# Patient Record
Sex: MALE | Race: WHITE | NOT HISPANIC OR LATINO | URBAN - METROPOLITAN AREA
[De-identification: names, ages, dates, MRNs, and addresses within clinical notes are randomized per-mention and may not be internally consistent; named-entity substitution may affect disease eponyms.]

---

## 2018-02-18 ENCOUNTER — EMERGENCY (EMERGENCY)
Facility: HOSPITAL | Age: 63
LOS: 1 days | Discharge: HOME | End: 2018-02-18
Attending: EMERGENCY MEDICINE | Admitting: EMERGENCY MEDICINE
Payer: MEDICARE

## 2018-02-18 VITALS
SYSTOLIC BLOOD PRESSURE: 171 MMHG | TEMPERATURE: 100 F | RESPIRATION RATE: 20 BRPM | HEART RATE: 106 BPM | DIASTOLIC BLOOD PRESSURE: 84 MMHG | OXYGEN SATURATION: 99 %

## 2018-02-18 VITALS
DIASTOLIC BLOOD PRESSURE: 81 MMHG | OXYGEN SATURATION: 97 % | HEART RATE: 74 BPM | SYSTOLIC BLOOD PRESSURE: 169 MMHG | TEMPERATURE: 98 F | RESPIRATION RATE: 20 BRPM

## 2018-02-18 LAB
ALBUMIN SERPL ELPH-MCNC: 4.6 G/DL — SIGNIFICANT CHANGE UP (ref 3.3–5.2)
ALP SERPL-CCNC: 118 U/L — SIGNIFICANT CHANGE UP (ref 40–120)
ALT FLD-CCNC: 27 U/L — SIGNIFICANT CHANGE UP
ANION GAP SERPL CALC-SCNC: 13 MMOL/L — SIGNIFICANT CHANGE UP (ref 5–17)
APTT BLD: 49 SEC — HIGH (ref 27.5–37.4)
AST SERPL-CCNC: 20 U/L — SIGNIFICANT CHANGE UP
BILIRUB SERPL-MCNC: 0.7 MG/DL — SIGNIFICANT CHANGE UP (ref 0.4–2)
BLD GP AB SCN SERPL QL: SIGNIFICANT CHANGE UP
BUN SERPL-MCNC: 17 MG/DL — SIGNIFICANT CHANGE UP (ref 8–20)
CALCIUM SERPL-MCNC: 10 MG/DL — SIGNIFICANT CHANGE UP (ref 8.6–10.2)
CHLORIDE SERPL-SCNC: 99 MMOL/L — SIGNIFICANT CHANGE UP (ref 98–107)
CO2 SERPL-SCNC: 25 MMOL/L — SIGNIFICANT CHANGE UP (ref 22–29)
CREAT SERPL-MCNC: 0.45 MG/DL — LOW (ref 0.5–1.3)
EOSINOPHIL # BLD AUTO: 0 K/UL — SIGNIFICANT CHANGE UP (ref 0–0.5)
EOSINOPHIL NFR BLD AUTO: 0 % — SIGNIFICANT CHANGE UP (ref 0–5)
GLUCOSE SERPL-MCNC: 143 MG/DL — HIGH (ref 70–115)
HCT VFR BLD CALC: 50 % — SIGNIFICANT CHANGE UP (ref 42–52)
HGB BLD-MCNC: 17.8 G/DL — SIGNIFICANT CHANGE UP (ref 14–18)
INR BLD: 1.17 RATIO — HIGH (ref 0.88–1.16)
LYMPHOCYTES # BLD AUTO: 0.7 K/UL — LOW (ref 1–4.8)
LYMPHOCYTES # BLD AUTO: 4.7 % — LOW (ref 20–55)
MCHC RBC-ENTMCNC: 31.6 PG — HIGH (ref 27–31)
MCHC RBC-ENTMCNC: 35.6 G/DL — SIGNIFICANT CHANGE UP (ref 32–36)
MCV RBC AUTO: 88.7 FL — SIGNIFICANT CHANGE UP (ref 80–94)
MONOCYTES # BLD AUTO: 0.8 K/UL — SIGNIFICANT CHANGE UP (ref 0–0.8)
MONOCYTES NFR BLD AUTO: 6.1 % — SIGNIFICANT CHANGE UP (ref 3–10)
NEUTROPHILS # BLD AUTO: 12.3 K/UL — HIGH (ref 1.8–8)
NEUTROPHILS NFR BLD AUTO: 88.6 % — HIGH (ref 37–73)
PLATELET # BLD AUTO: 179 K/UL — SIGNIFICANT CHANGE UP (ref 150–400)
POTASSIUM SERPL-MCNC: 4 MMOL/L — SIGNIFICANT CHANGE UP (ref 3.5–5.3)
POTASSIUM SERPL-SCNC: 4 MMOL/L — SIGNIFICANT CHANGE UP (ref 3.5–5.3)
PROT SERPL-MCNC: 7.6 G/DL — SIGNIFICANT CHANGE UP (ref 6.6–8.7)
PROTHROM AB SERPL-ACNC: 12.9 SEC — HIGH (ref 9.8–12.7)
RBC # BLD: 5.64 M/UL — SIGNIFICANT CHANGE UP (ref 4.6–6.2)
RBC # FLD: 13.1 % — SIGNIFICANT CHANGE UP (ref 11–15.6)
SODIUM SERPL-SCNC: 137 MMOL/L — SIGNIFICANT CHANGE UP (ref 135–145)
TYPE + AB SCN PNL BLD: SIGNIFICANT CHANGE UP
WBC # BLD: 13.9 K/UL — HIGH (ref 4.8–10.8)
WBC # FLD AUTO: 13.9 K/UL — HIGH (ref 4.8–10.8)

## 2018-02-18 PROCEDURE — 85610 PROTHROMBIN TIME: CPT

## 2018-02-18 PROCEDURE — 80053 COMPREHEN METABOLIC PANEL: CPT

## 2018-02-18 PROCEDURE — 86901 BLOOD TYPING SEROLOGIC RH(D): CPT

## 2018-02-18 PROCEDURE — 74177 CT ABD & PELVIS W/CONTRAST: CPT

## 2018-02-18 PROCEDURE — 74177 CT ABD & PELVIS W/CONTRAST: CPT | Mod: 26

## 2018-02-18 PROCEDURE — 93010 ELECTROCARDIOGRAM REPORT: CPT

## 2018-02-18 PROCEDURE — 71045 X-RAY EXAM CHEST 1 VIEW: CPT

## 2018-02-18 PROCEDURE — 85027 COMPLETE CBC AUTOMATED: CPT

## 2018-02-18 PROCEDURE — 83690 ASSAY OF LIPASE: CPT

## 2018-02-18 PROCEDURE — 85730 THROMBOPLASTIN TIME PARTIAL: CPT

## 2018-02-18 PROCEDURE — 99284 EMERGENCY DEPT VISIT MOD MDM: CPT

## 2018-02-18 PROCEDURE — 36415 COLL VENOUS BLD VENIPUNCTURE: CPT

## 2018-02-18 PROCEDURE — 99284 EMERGENCY DEPT VISIT MOD MDM: CPT | Mod: 25

## 2018-02-18 PROCEDURE — 71045 X-RAY EXAM CHEST 1 VIEW: CPT | Mod: 26

## 2018-02-18 PROCEDURE — 96374 THER/PROPH/DIAG INJ IV PUSH: CPT | Mod: XU

## 2018-02-18 PROCEDURE — 93005 ELECTROCARDIOGRAM TRACING: CPT

## 2018-02-18 PROCEDURE — 86900 BLOOD TYPING SEROLOGIC ABO: CPT

## 2018-02-18 PROCEDURE — 86850 RBC ANTIBODY SCREEN: CPT

## 2018-02-18 RX ORDER — TEMAZEPAM 15 MG/1
1 CAPSULE ORAL
Qty: 0 | Refills: 0 | COMMUNITY

## 2018-02-18 RX ORDER — DOCUSATE SODIUM 100 MG
0 CAPSULE ORAL
Qty: 0 | Refills: 0 | COMMUNITY

## 2018-02-18 RX ORDER — OLANZAPINE 15 MG/1
1 TABLET, FILM COATED ORAL
Qty: 0 | Refills: 0 | COMMUNITY

## 2018-02-18 RX ORDER — ASCORBIC ACID 60 MG
1 TABLET,CHEWABLE ORAL
Qty: 0 | Refills: 0 | COMMUNITY

## 2018-02-18 RX ORDER — CHOLECALCIFEROL (VITAMIN D3) 125 MCG
1 CAPSULE ORAL
Qty: 0 | Refills: 0 | COMMUNITY

## 2018-02-18 RX ORDER — SENNA PLUS 8.6 MG/1
2 TABLET ORAL
Qty: 0 | Refills: 0 | COMMUNITY

## 2018-02-18 RX ORDER — OMEPRAZOLE 10 MG/1
1 CAPSULE, DELAYED RELEASE ORAL
Qty: 0 | Refills: 0 | COMMUNITY

## 2018-02-18 RX ORDER — PANTOPRAZOLE SODIUM 20 MG/1
40 TABLET, DELAYED RELEASE ORAL ONCE
Qty: 0 | Refills: 0 | Status: COMPLETED | OUTPATIENT
Start: 2018-02-18 | End: 2018-02-18

## 2018-02-18 RX ORDER — MULTIVIT WITH MIN/MFOLATE/K2 340-15/3 G
50 POWDER (GRAM) ORAL ONCE
Qty: 0 | Refills: 0 | Status: COMPLETED | OUTPATIENT
Start: 2018-02-18 | End: 2018-02-18

## 2018-02-18 RX ORDER — SODIUM CHLORIDE 9 MG/ML
1000 INJECTION INTRAMUSCULAR; INTRAVENOUS; SUBCUTANEOUS ONCE
Qty: 0 | Refills: 0 | Status: COMPLETED | OUTPATIENT
Start: 2018-02-18 | End: 2018-02-18

## 2018-02-18 RX ADMIN — SODIUM CHLORIDE 1000 MILLILITER(S): 9 INJECTION INTRAMUSCULAR; INTRAVENOUS; SUBCUTANEOUS at 18:38

## 2018-02-18 RX ADMIN — PANTOPRAZOLE SODIUM 40 MILLIGRAM(S): 20 TABLET, DELAYED RELEASE ORAL at 18:38

## 2018-02-18 RX ADMIN — Medication 50 MILLILITER(S): at 23:34

## 2018-02-18 NOTE — ED PROVIDER NOTE - PROGRESS NOTE DETAILS
Spoke with Marissa Bagley, as  for [facility]. She informs us that no one can provide consent for procedures. Given patient's complaints and clinical findings, he requires emergent CT scanning to r/o obstruction. Will CT with IV contrast. feeling better abd soft nt nd return to ed for intractable abd pain fevers or any overall worsening feeling better abd soft nt nd return to ed for intractable abd pain fevers or any overall worsening. will provide mag citrate for constipaton aid agrees with plan of care

## 2018-02-18 NOTE — ED PROVIDER NOTE - UNABLE TO OBTAIN
Pt has baseline dementia and does not speak. Unresponsive pt with severe mental retardation. no ROS available.

## 2018-02-18 NOTE — ED ADULT NURSE REASSESSMENT NOTE - NS ED NURSE REASSESS COMMENT FT1
Report recvd from off going RN, pt recvd lying on stretcher, NAD, non-verbal, patient caregiver at bedside, POC discussed with caregiver awaiting dispo and test results at this time.

## 2018-02-18 NOTE — ED PROVIDER NOTE - MEDICAL DECISION MAKING DETAILS
Pt w history of vomiting black vomit x 2. Pt tender to palpation of epigastrium. Will CT to r/o obstruction.

## 2018-02-18 NOTE — ED PROVIDER NOTE - PMH
Anemia, unspecified type    Mental retardation    Schizoaffective disorder, bipolar type    Spastic quadriplegic cerebral palsy

## 2018-02-18 NOTE — ED ADULT TRIAGE NOTE - CHIEF COMPLAINT QUOTE
pt PAVAN from group home with reports of black vomit x 2 as per care taker. pt is awake, at baseline mental status, hx MR. even and unlabored resps, brown stool noted on arrival, pt  cleaned and changed.

## 2018-02-18 NOTE — ED ADULT NURSE REASSESSMENT NOTE - NS ED NURSE REASSESS COMMENT FT1
MD at pt bedside, test results and POC discussed with caregiver, who verbalized understanding of discharge plan, Pt in NAD, VSS, no emesis noted or reported from caregiver, discharge instructions given and explained, including discharge medication purpose, dose, frequency and s/e, pt caregiver verbalized understanding of instructions, questions encouraged and answered appropriately, PIV d/c'd per facility protocol, pt tolerated well, DCD in place, pt safely discharged home with caregiver, awaiting Ambulance.

## 2018-02-20 NOTE — ED POST DISCHARGE NOTE - ADDITIONAL DOCUMENTATION
Pt live in foster care- called foster care guardian home and cell ([phone]) No answer, left msg and letter sent

## 2020-01-21 ENCOUNTER — EMERGENCY (EMERGENCY)
Facility: HOSPITAL | Age: 65
LOS: 0 days | Discharge: HOME | End: 2020-01-21
Attending: EMERGENCY MEDICINE
Payer: MEDICARE

## 2020-01-21 VITALS
TEMPERATURE: 98 F | HEART RATE: 117 BPM | SYSTOLIC BLOOD PRESSURE: 136 MMHG | RESPIRATION RATE: 18 BRPM | OXYGEN SATURATION: 96 % | DIASTOLIC BLOOD PRESSURE: 98 MMHG

## 2020-01-21 VITALS
DIASTOLIC BLOOD PRESSURE: 97 MMHG | SYSTOLIC BLOOD PRESSURE: 158 MMHG | RESPIRATION RATE: 18 BRPM | HEART RATE: 98 BPM | OXYGEN SATURATION: 96 %

## 2020-01-21 DIAGNOSIS — F25.0 SCHIZOAFFECTIVE DISORDER, BIPOLAR TYPE: ICD-10-CM

## 2020-01-21 DIAGNOSIS — I82.451 ACUTE EMBOLISM AND THROMBOSIS OF RIGHT PERONEAL VEIN: ICD-10-CM

## 2020-01-21 DIAGNOSIS — Z79.84 LONG TERM (CURRENT) USE OF ORAL HYPOGLYCEMIC DRUGS: ICD-10-CM

## 2020-01-21 DIAGNOSIS — Z79.83 LONG TERM (CURRENT) USE OF BISPHOSPHONATES: ICD-10-CM

## 2020-01-21 DIAGNOSIS — Z79.52 LONG TERM (CURRENT) USE OF SYSTEMIC STEROIDS: ICD-10-CM

## 2020-01-21 DIAGNOSIS — G80.0 SPASTIC QUADRIPLEGIC CEREBRAL PALSY: ICD-10-CM

## 2020-01-21 DIAGNOSIS — M79.604 PAIN IN RIGHT LEG: ICD-10-CM

## 2020-01-21 DIAGNOSIS — D64.9 ANEMIA, UNSPECIFIED: ICD-10-CM

## 2020-01-21 DIAGNOSIS — R60.0 LOCALIZED EDEMA: ICD-10-CM

## 2020-01-21 DIAGNOSIS — Z79.1 LONG TERM (CURRENT) USE OF NON-STEROIDAL ANTI-INFLAMMATORIES (NSAID): ICD-10-CM

## 2020-01-21 DIAGNOSIS — F79 UNSPECIFIED INTELLECTUAL DISABILITIES: ICD-10-CM

## 2020-01-21 DIAGNOSIS — Z79.811 LONG TERM (CURRENT) USE OF AROMATASE INHIBITORS: ICD-10-CM

## 2020-01-21 LAB
ADD ON TEST-SPECIMEN IN LAB: SIGNIFICANT CHANGE UP
ALBUMIN SERPL ELPH-MCNC: 3.7 G/DL — SIGNIFICANT CHANGE UP (ref 3.3–5)
ALP SERPL-CCNC: 148 U/L — HIGH (ref 40–120)
ALT FLD-CCNC: 23 U/L — SIGNIFICANT CHANGE UP (ref 12–78)
ANION GAP SERPL CALC-SCNC: 9 MMOL/L — SIGNIFICANT CHANGE UP (ref 5–17)
APTT BLD: 42.7 SEC — HIGH (ref 27.5–36.3)
AST SERPL-CCNC: 21 U/L — SIGNIFICANT CHANGE UP (ref 15–37)
BASOPHILS # BLD AUTO: 0.04 K/UL — SIGNIFICANT CHANGE UP (ref 0–0.2)
BASOPHILS NFR BLD AUTO: 0.6 % — SIGNIFICANT CHANGE UP (ref 0–2)
BILIRUB SERPL-MCNC: 0.4 MG/DL — SIGNIFICANT CHANGE UP (ref 0.2–1.2)
BUN SERPL-MCNC: 20 MG/DL — SIGNIFICANT CHANGE UP (ref 7–23)
CALCIUM SERPL-MCNC: 9.6 MG/DL — SIGNIFICANT CHANGE UP (ref 8.5–10.1)
CHLORIDE SERPL-SCNC: 109 MMOL/L — HIGH (ref 96–108)
CO2 SERPL-SCNC: 23 MMOL/L — SIGNIFICANT CHANGE UP (ref 22–31)
CREAT SERPL-MCNC: 0.87 MG/DL — SIGNIFICANT CHANGE UP (ref 0.5–1.3)
EOSINOPHIL # BLD AUTO: 0.08 K/UL — SIGNIFICANT CHANGE UP (ref 0–0.5)
EOSINOPHIL NFR BLD AUTO: 1.2 % — SIGNIFICANT CHANGE UP (ref 0–6)
ERYTHROCYTE [SEDIMENTATION RATE] IN BLOOD: 10 MM/HR — SIGNIFICANT CHANGE UP (ref 0–20)
GLUCOSE SERPL-MCNC: 136 MG/DL — HIGH (ref 70–99)
HCT VFR BLD CALC: 47.6 % — SIGNIFICANT CHANGE UP (ref 39–50)
HGB BLD-MCNC: 15.8 G/DL — SIGNIFICANT CHANGE UP (ref 13–17)
IMM GRANULOCYTES NFR BLD AUTO: 0.6 % — SIGNIFICANT CHANGE UP (ref 0–1.5)
INR BLD: 1.09 RATIO — SIGNIFICANT CHANGE UP (ref 0.88–1.16)
LACTATE SERPL-SCNC: 3.6 MMOL/L — HIGH (ref 0.7–2)
LYMPHOCYTES # BLD AUTO: 0.99 K/UL — LOW (ref 1–3.3)
LYMPHOCYTES # BLD AUTO: 14.7 % — SIGNIFICANT CHANGE UP (ref 13–44)
MCHC RBC-ENTMCNC: 29.5 PG — SIGNIFICANT CHANGE UP (ref 27–34)
MCHC RBC-ENTMCNC: 33.2 GM/DL — SIGNIFICANT CHANGE UP (ref 32–36)
MCV RBC AUTO: 88.8 FL — SIGNIFICANT CHANGE UP (ref 80–100)
MONOCYTES # BLD AUTO: 0.6 K/UL — SIGNIFICANT CHANGE UP (ref 0–0.9)
MONOCYTES NFR BLD AUTO: 8.9 % — SIGNIFICANT CHANGE UP (ref 2–14)
NEUTROPHILS # BLD AUTO: 5 K/UL — SIGNIFICANT CHANGE UP (ref 1.8–7.4)
NEUTROPHILS NFR BLD AUTO: 74 % — SIGNIFICANT CHANGE UP (ref 43–77)
PLATELET # BLD AUTO: 190 K/UL — SIGNIFICANT CHANGE UP (ref 150–400)
POTASSIUM SERPL-MCNC: 4.3 MMOL/L — SIGNIFICANT CHANGE UP (ref 3.5–5.3)
POTASSIUM SERPL-SCNC: 4.3 MMOL/L — SIGNIFICANT CHANGE UP (ref 3.5–5.3)
PROT SERPL-MCNC: 7.7 GM/DL — SIGNIFICANT CHANGE UP (ref 6–8.3)
PROTHROM AB SERPL-ACNC: 12.1 SEC — SIGNIFICANT CHANGE UP (ref 10–12.9)
RBC # BLD: 5.36 M/UL — SIGNIFICANT CHANGE UP (ref 4.2–5.8)
RBC # FLD: 13.5 % — SIGNIFICANT CHANGE UP (ref 10.3–14.5)
SODIUM SERPL-SCNC: 141 MMOL/L — SIGNIFICANT CHANGE UP (ref 135–145)
URATE SERPL-MCNC: 3.9 MG/DL — SIGNIFICANT CHANGE UP (ref 3.4–8.8)
WBC # BLD: 6.75 K/UL — SIGNIFICANT CHANGE UP (ref 3.8–10.5)
WBC # FLD AUTO: 6.75 K/UL — SIGNIFICANT CHANGE UP (ref 3.8–10.5)

## 2020-01-21 PROCEDURE — 71045 X-RAY EXAM CHEST 1 VIEW: CPT | Mod: 26

## 2020-01-21 PROCEDURE — 73502 X-RAY EXAM HIP UNI 2-3 VIEWS: CPT | Mod: 26,RT

## 2020-01-21 PROCEDURE — 86140 C-REACTIVE PROTEIN: CPT

## 2020-01-21 PROCEDURE — 73562 X-RAY EXAM OF KNEE 3: CPT | Mod: 26,RT

## 2020-01-21 PROCEDURE — 85610 PROTHROMBIN TIME: CPT

## 2020-01-21 PROCEDURE — 87040 BLOOD CULTURE FOR BACTERIA: CPT | Mod: 91

## 2020-01-21 PROCEDURE — 73552 X-RAY EXAM OF FEMUR 2/>: CPT | Mod: RT

## 2020-01-21 PROCEDURE — 84550 ASSAY OF BLOOD/URIC ACID: CPT

## 2020-01-21 PROCEDURE — 99284 EMERGENCY DEPT VISIT MOD MDM: CPT

## 2020-01-21 PROCEDURE — 96361 HYDRATE IV INFUSION ADD-ON: CPT

## 2020-01-21 PROCEDURE — 36415 COLL VENOUS BLD VENIPUNCTURE: CPT

## 2020-01-21 PROCEDURE — 96372 THER/PROPH/DIAG INJ SC/IM: CPT | Mod: XU

## 2020-01-21 PROCEDURE — 73552 X-RAY EXAM OF FEMUR 2/>: CPT | Mod: 26,RT

## 2020-01-21 PROCEDURE — 85025 COMPLETE CBC W/AUTO DIFF WBC: CPT

## 2020-01-21 PROCEDURE — 80053 COMPREHEN METABOLIC PANEL: CPT

## 2020-01-21 PROCEDURE — 96365 THER/PROPH/DIAG IV INF INIT: CPT | Mod: XU

## 2020-01-21 PROCEDURE — 96366 THER/PROPH/DIAG IV INF ADDON: CPT | Mod: XU

## 2020-01-21 PROCEDURE — 73562 X-RAY EXAM OF KNEE 3: CPT | Mod: RT

## 2020-01-21 PROCEDURE — 83605 ASSAY OF LACTIC ACID: CPT | Mod: 91

## 2020-01-21 PROCEDURE — 71045 X-RAY EXAM CHEST 1 VIEW: CPT

## 2020-01-21 PROCEDURE — 99284 EMERGENCY DEPT VISIT MOD MDM: CPT | Mod: 25

## 2020-01-21 PROCEDURE — 85652 RBC SED RATE AUTOMATED: CPT

## 2020-01-21 PROCEDURE — 93971 EXTREMITY STUDY: CPT | Mod: RT

## 2020-01-21 PROCEDURE — 85730 THROMBOPLASTIN TIME PARTIAL: CPT

## 2020-01-21 PROCEDURE — 93971 EXTREMITY STUDY: CPT | Mod: 26,RT

## 2020-01-21 PROCEDURE — 73502 X-RAY EXAM HIP UNI 2-3 VIEWS: CPT | Mod: RT

## 2020-01-21 RX ORDER — ENOXAPARIN SODIUM 100 MG/ML
60 INJECTION SUBCUTANEOUS
Qty: 20 | Refills: 0
Start: 2020-01-21

## 2020-01-21 RX ORDER — SODIUM CHLORIDE 9 MG/ML
2000 INJECTION INTRAMUSCULAR; INTRAVENOUS; SUBCUTANEOUS ONCE
Refills: 0 | Status: COMPLETED | OUTPATIENT
Start: 2020-01-21 | End: 2020-01-21

## 2020-01-21 RX ORDER — MORPHINE SULFATE 50 MG/1
4 CAPSULE, EXTENDED RELEASE ORAL ONCE
Refills: 0 | Status: DISCONTINUED | OUTPATIENT
Start: 2020-01-21 | End: 2020-01-21

## 2020-01-21 RX ORDER — ENOXAPARIN SODIUM 100 MG/ML
60 INJECTION SUBCUTANEOUS ONCE
Refills: 0 | Status: COMPLETED | OUTPATIENT
Start: 2020-01-21 | End: 2020-01-21

## 2020-01-21 RX ORDER — VANCOMYCIN HCL 1 G
1000 VIAL (EA) INTRAVENOUS ONCE
Refills: 0 | Status: COMPLETED | OUTPATIENT
Start: 2020-01-21 | End: 2020-01-21

## 2020-01-21 RX ADMIN — ENOXAPARIN SODIUM 60 MILLIGRAM(S): 100 INJECTION SUBCUTANEOUS at 17:55

## 2020-01-21 RX ADMIN — Medication 1000 MILLIGRAM(S): at 19:43

## 2020-01-21 RX ADMIN — Medication 250 MILLIGRAM(S): at 17:54

## 2020-01-21 RX ADMIN — SODIUM CHLORIDE 1000 MILLILITER(S): 9 INJECTION INTRAMUSCULAR; INTRAVENOUS; SUBCUTANEOUS at 17:15

## 2020-01-21 RX ADMIN — SODIUM CHLORIDE 2000 MILLILITER(S): 9 INJECTION INTRAMUSCULAR; INTRAVENOUS; SUBCUTANEOUS at 19:43

## 2020-01-21 NOTE — ED PROVIDER NOTE - PATIENT PORTAL LINK FT
You can access the FollowMyHealth Patient Portal offered by [facility] by registering at the following website: [email or web address]. By joining [email or web address] FollowMyHealth portal, you will also be able to view your health information using other applications (apps) compatible with our system.

## 2020-01-21 NOTE — ED PROVIDER NOTE - CARE PROVIDER_API CALL
Florence Lyons (MD)  Surgery; Vascular Surgery  [address] [place] [address]  Phone: [phone]  Fax: [phone]  Follow Up Time: 1-3 Days

## 2020-01-21 NOTE — ED PROVIDER NOTE - OBJECTIVE STATEMENT
PA NOTE: Patient is a 64 year old male with PMHx of mental retardation, Schizophrenia, Spastic quadriplegic cerebral palsy, anemia, Hx of DVT who presents to [facility] c/o RLE swelling and pain x 2 days. Unknown injury or trauma. Patient lives at home with visiting aid, but goes to [facility] for "exercise program". Patient was at the NH this morning and was found to have RLE swelling and pain, sent to [facility] for evaluation. Patient is non-verbal, unable to provide history. History/ROS obtained from NH notes, and patient's aid at bedside. ~NOHEMY Shin

## 2020-01-21 NOTE — ED ADULT NURSE NOTE - OBJECTIVE STATEMENT
Pt dropped off by Criss for right left pain/swelling without aide. PTs personal aide came to answer all questions. Aide states after pt got home from [facility]  pt was complaining of right leg pain. pt nonverbal due to MR. Right leg appears to be tight, no redness. old scar to right knee/leg, aide unaware of what the surgery was. used mik lift to place pt in bed. pt came in with personal wheelchair that does not lock.

## 2020-01-21 NOTE — ED PROVIDER NOTE - CLINICAL SUMMARY MEDICAL DECISION MAKING FREE TEXT BOX
PA NOTE: 64 year old male with PMHx of mental retardation, Schizophrenia, Spastic quadriplegic cerebral palsy, anemia, Hx of DVT seen and evaluated for RLE swelling and pain x 2 days. Unknown injury or trauma. Patient lives at home with visiting aid, but goes to [facility] for "exercise program". Patient was at the NH this morning and was found to have RLE swelling and pain, sent to [facility] for evaluation. Patient is non-verbal, unable to provide history. History/ROS obtained from NH notes, and patient's aid at bedside. Will get RLE xrays, doppler, basic labs. Reassess. ~NOHEMY Shin PA NOTE: 64 year old male with PMHx of mental retardation, Schizophrenia, Spastic quadriplegic cerebral palsy, anemia, Hx of DVT seen and evaluated for RLE swelling and pain x 2 days. Unknown injury or trauma. Patient lives at home with visiting aid, but goes to [facility] for "exercise program". Patient was at the NH this morning and was found to have RLE swelling and pain, sent to [facility] for evaluation. Patient is non-verbal, unable to provide history. History/ROS obtained from NH notes, and patient's aid at bedside. RLE Doppler +DVT. Elevated lactate treated with IVF. Vanco x1 given presumptively for possible infection, was ruled out. spoke with hospitalist, admission denied. Discussed with rosario Smith to dc on Lovenox.  involved in case and assisted with dc planning. Vascular follow up encouraged. Patient re-examined and re-evaluated. Patient feels much better at this time. ED evaluation, Diagnosis and management discussed with the [facility] home care aid-worker in detail. Workup results discussed with ED attending, OK to dc home. Vascular surgery follow up encouraged.  Strict ED return instructions discussed in detail and home health aid given the opportunity to ask any questions about their discharge diagnosis and instructions who verbalized understanding. ~ NOHEMY Shin

## 2020-01-21 NOTE — ED PROVIDER NOTE - PROGRESS NOTE DETAILS
PA NOTE: 64 year old male with PMHx of mental retardation, Schizophrenia, Spastic quadriplegic cerebral palsy, anemia, Hx of DVT seen and evaluated for RLE swelling and pain x 2 days. Unknown injury or trauma. Patient lives at home with visiting aid, but goes to [facility] for "exercise program". Patient was at the NH this morning and was found to have RLE swelling and pain, sent to [facility] for evaluation. Patient is non-verbal, unable to provide history. History/ROS obtained from NH notes, and patient's aid at bedside. Will get RLE xrays, doppler, basic labs. Reassess. ~NOHEMY hSin spoke with hospitalist, admission denied. Discussed with rosario Smith to dc on Lovenox.  involved in case and assisted with dc planning. Vascular follow up encouraged. Patient re-examined and re-evaluated. Patient feels much better at this time. ED evaluation, Diagnosis and management discussed with the [facility] home care aid-worker in detail. Workup results discussed with ED attending, OK to dc home. Vascular surgery follow up encouraged.  Strict ED return instructions discussed in detail and home health aid given the opportunity to ask any questions about their discharge diagnosis and instructions who verbalized understanding. ~ NOHEMY Shin Kathrine WHEAT: Pt with abd soft nt, ND, CTAB. Right leg swollen, erythematous.

## 2020-01-21 NOTE — ED PROVIDER NOTE - NSFOLLOWUPINSTRUCTIONS_ED_ALL_ED_FT
DEEP VEIN THROMBOSIS PREVENTION - [facility](R) Instructions(ER/ED)     Deep Vein Thrombosis Prevention    WHAT YOU NEED TO KNOW:    Some conditions increase your risk for a DVT. These include a family history of blood clots or a blood clotting disorder, such as factor V Leiden. Lack of activity, cigarette smoking, obesity, and birth control pills can also increase the risk. You can lower your risk by following the directions from your doctor or specialist. You can also make lifestyle changes to prevent blood clots.Thrombus and Embolus     PLEASE FOLLOW UP WITH VASCULAR SURGERY WITHIN 1 WEEK FROM TODAY    DISCHARGE INSTRUCTIONS:    Call your doctor or hematologist if:     Your arm or leg feels warm, tender, and painful. It may look swollen and red.      You have questions or concerns about your condition or care.    Medicines:     Blood thinners help prevent blood clots. Clots can cause strokes, heart attacks, and death. The following are general safety guidelines to follow while you are taking a blood thinner:  Watch for bleeding and bruising while you take blood thinners. Watch for bleeding from your gums or nose. Watch for blood in your urine and bowel movements. Use a soft washcloth on your skin, and a soft toothbrush to brush your teeth. This can keep your skin and gums from bleeding. If you shave, use an electric shaver. Do not play contact sports.       Tell your dentist and other healthcare providers that you take a blood thinner. Wear a bracelet or necklace that says you take this medicine.       Do not start or stop any other medicines unless your healthcare provider tells you to. Many medicines cannot be used with blood thinners.      Take your blood thinner exactly as prescribed by your healthcare provider. Do not skip does or take less than prescribed. Tell your provider right away if you forget to take your blood thinner, or if you take too much.      Warfarin is a blood thinner that you may need to take. The following are things you should be aware of if you take warfarin:   Foods and medicines can affect the amount of warfarin in your blood. Do not make major changes to your diet while you take warfarin. Warfarin works best when you eat about the same amount of vitamin K every day. Vitamin K is found in green leafy vegetables and certain other foods. Ask for more information about what to eat when you are taking warfarin.      You will need to see your healthcare provider for follow-up visits when you are on warfarin. You will need regular blood tests. These tests are used to decide how much medicine you need.       Take your medicine as directed. Contact your healthcare provider if you think your medicine is not helping or if you have side effects. Tell him or her if you are allergic to any medicine. Keep a list of the medicines, vitamins, and herbs you take. Include the amounts, and when and why you take them. Bring the list or the pill bottles to follow-up visits. Carry your medicine list with you in case of an emergency.    What you can do to prevent a DVT:     Maintain a healthy weight. Ask your healthcare provider how much you should weigh. Ask him or her to help you create a weight loss plan if you are overweight.      Do not smoke. Nicotine and other chemicals in cigarettes and cigars can damage blood vessels and increase your risk for a DVT. Ask your healthcare provider for information if you currently smoke and need help to quit. E-cigarettes or smokeless tobacco still contain nicotine. Talk to your healthcare provider before you use these products.      Change your body position or move around often. Move and stretch in your seat several times each hour if you travel by car or work at a desk. In an airplane, get up and walk every hour. In an airplane, get up and walk every hour. Move your legs by tightening and releasing your leg muscles while sitting. You can also move your legs while sitting by raising and lowering your heels. Keep your toes on the floor while you do this. You can also raise and lower your toes while keeping your heels on the floor. DVT Prevention Heel Raise     DVT Prevention Toe Raise           Exercise regularly to help increase your blood flow. Walking is a good low-impact exercise. Talk to your healthcare provider about the best exercise plan for you. Walking for Exercise           Ask about birth control if you are a woman who takes the pill. A birth control pill increases the risk for blood clots in certain women. The risk is higher if you are also older than 35, smoke cigarettes, or have a blood clotting disorder. Talk to your healthcare provider about other ways to prevent pregnancy, such as a cervical cap or intrauterine device (IUD).    What you can do to prevent another DVT: After you have a DVT, your risk for blood clots will always be higher. The following can help lower your risk:     Take medicines as directed. Your healthcare provider may recommend blood thinners and other medicines to help prevent blood clots. It is important not to skip doses. The medicine may not be able to prevent blood clots if the amount in your blood goes too low.      Wear pressure stockings as directed. The stockings are tight and put pressure on your legs. This improves blood flow and helps prevent clots. Wear the stockings during the day. Do not wear them when you sleep. Your healthcare provider will tell you how long you need to keep using the stockings.Pressure Stockings            Get up and move as directed after surgery or an injury, or during an illness. Early and regular movement can help decrease your risk for a DVT by helping to increase your blood flow. Ask your healthcare provider what type of activity you need and how often you should do it.    Follow up with your doctor or hematologist as directed: You may need to come in regularly for scans to check for blood clots. Your blood may checked to see how long it takes to clot. Your doctor or specialist will tell you if you need to have this test and how often to have it. Write down your questions so you remember to ask them during your visits.

## 2020-01-21 NOTE — ED PROVIDER NOTE - MUSCULOSKELETAL, MLM
Spine appears normal, range of motion is not limited, no muscle or joint tenderness. RLE: Right leg appears externally rotated, ?chronic. +Mild-to-moderate STS right anterior thigh and right knee. +Diffuse tenderness. +Right knee joint effusion. Mild warmth. Painful ROM. No significant erythema. 2+ distal pulses.

## 2020-01-26 LAB
CULTURE RESULTS: SIGNIFICANT CHANGE UP
CULTURE RESULTS: SIGNIFICANT CHANGE UP
SPECIMEN SOURCE: SIGNIFICANT CHANGE UP
SPECIMEN SOURCE: SIGNIFICANT CHANGE UP

## 2020-01-30 ENCOUNTER — INPATIENT (INPATIENT)
Facility: HOSPITAL | Age: 65
LOS: 8 days | Discharge: HOME | DRG: 391 | End: 2020-02-08
Attending: INTERNAL MEDICINE | Admitting: HOSPITALIST
Payer: MEDICARE

## 2020-01-30 VITALS
OXYGEN SATURATION: 94 % | HEIGHT: 67 IN | DIASTOLIC BLOOD PRESSURE: 94 MMHG | SYSTOLIC BLOOD PRESSURE: 156 MMHG | HEART RATE: 119 BPM | WEIGHT: 125 LBS | RESPIRATION RATE: 20 BRPM

## 2020-01-30 DIAGNOSIS — K52.9 NONINFECTIVE GASTROENTERITIS AND COLITIS, UNSPECIFIED: ICD-10-CM

## 2020-01-30 DIAGNOSIS — R09.89 OTHER SPECIFIED SYMPTOMS AND SIGNS INVOLVING THE CIRCULATORY AND RESPIRATORY SYSTEMS: ICD-10-CM

## 2020-01-30 LAB
ALBUMIN SERPL ELPH-MCNC: 3.8 G/DL — SIGNIFICANT CHANGE UP (ref 3.3–5.2)
ALP SERPL-CCNC: 220 U/L — HIGH (ref 40–120)
ALT FLD-CCNC: 53 U/L — HIGH
ANION GAP SERPL CALC-SCNC: 15 MMOL/L — SIGNIFICANT CHANGE UP (ref 5–17)
APPEARANCE UR: CLEAR — SIGNIFICANT CHANGE UP
AST SERPL-CCNC: 42 U/L — HIGH
BACTERIA # UR AUTO: NEGATIVE — SIGNIFICANT CHANGE UP
BASOPHILS # BLD AUTO: 0.02 K/UL — SIGNIFICANT CHANGE UP (ref 0–0.2)
BASOPHILS NFR BLD AUTO: 0.2 % — SIGNIFICANT CHANGE UP (ref 0–2)
BILIRUB SERPL-MCNC: 0.8 MG/DL — SIGNIFICANT CHANGE UP (ref 0.4–2)
BILIRUB UR-MCNC: ABNORMAL
BUN SERPL-MCNC: 25 MG/DL — HIGH (ref 8–20)
C DIFF BY PCR RESULT: SIGNIFICANT CHANGE UP
C DIFF TOX GENS STL QL NAA+PROBE: SIGNIFICANT CHANGE UP
CALCIUM SERPL-MCNC: 9.4 MG/DL — SIGNIFICANT CHANGE UP (ref 8.6–10.2)
CHLORIDE SERPL-SCNC: 98 MMOL/L — SIGNIFICANT CHANGE UP (ref 98–107)
CO2 SERPL-SCNC: 23 MMOL/L — SIGNIFICANT CHANGE UP (ref 22–29)
COLOR SPEC: ABNORMAL
COMMENT - URINE: SIGNIFICANT CHANGE UP
CREAT SERPL-MCNC: 0.72 MG/DL — SIGNIFICANT CHANGE UP (ref 0.5–1.3)
DIFF PNL FLD: ABNORMAL
EOSINOPHIL # BLD AUTO: 0.02 K/UL — SIGNIFICANT CHANGE UP (ref 0–0.5)
EOSINOPHIL NFR BLD AUTO: 0.2 % — SIGNIFICANT CHANGE UP (ref 0–6)
EPI CELLS # UR: NEGATIVE — SIGNIFICANT CHANGE UP
GLUCOSE SERPL-MCNC: 135 MG/DL — HIGH (ref 70–99)
GLUCOSE UR QL: NEGATIVE MG/DL — SIGNIFICANT CHANGE UP
HCT VFR BLD CALC: 55.4 % — HIGH (ref 39–50)
HGB BLD-MCNC: 17.7 G/DL — HIGH (ref 13–17)
IMM GRANULOCYTES NFR BLD AUTO: 0.2 % — SIGNIFICANT CHANGE UP (ref 0–1.5)
KETONES UR-MCNC: ABNORMAL
LEUKOCYTE ESTERASE UR-ACNC: ABNORMAL
LIDOCAIN IGE QN: 12 U/L — LOW (ref 22–51)
LYMPHOCYTES # BLD AUTO: 0.59 K/UL — LOW (ref 1–3.3)
LYMPHOCYTES # BLD AUTO: 5.8 % — LOW (ref 13–44)
MAGNESIUM SERPL-MCNC: 2.7 MG/DL — HIGH (ref 1.6–2.6)
MCHC RBC-ENTMCNC: 28.8 PG — SIGNIFICANT CHANGE UP (ref 27–34)
MCHC RBC-ENTMCNC: 31.9 GM/DL — LOW (ref 32–36)
MCV RBC AUTO: 90.2 FL — SIGNIFICANT CHANGE UP (ref 80–100)
MONOCYTES # BLD AUTO: 1.39 K/UL — HIGH (ref 0–0.9)
MONOCYTES NFR BLD AUTO: 13.6 % — SIGNIFICANT CHANGE UP (ref 2–14)
NEUTROPHILS # BLD AUTO: 8.15 K/UL — HIGH (ref 1.8–7.4)
NEUTROPHILS NFR BLD AUTO: 80 % — HIGH (ref 43–77)
NITRITE UR-MCNC: NEGATIVE — SIGNIFICANT CHANGE UP
PH UR: 5 — SIGNIFICANT CHANGE UP (ref 5–8)
PLATELET # BLD AUTO: 242 K/UL — SIGNIFICANT CHANGE UP (ref 150–400)
POTASSIUM SERPL-MCNC: 5.1 MMOL/L — SIGNIFICANT CHANGE UP (ref 3.5–5.3)
POTASSIUM SERPL-SCNC: 5.1 MMOL/L — SIGNIFICANT CHANGE UP (ref 3.5–5.3)
PROT SERPL-MCNC: 7.4 G/DL — SIGNIFICANT CHANGE UP (ref 6.6–8.7)
PROT UR-MCNC: 30 MG/DL
RBC # BLD: 6.14 M/UL — HIGH (ref 4.2–5.8)
RBC # FLD: 13.7 % — SIGNIFICANT CHANGE UP (ref 10.3–14.5)
RBC CASTS # UR COMP ASSIST: NEGATIVE /HPF — SIGNIFICANT CHANGE UP (ref 0–4)
SODIUM SERPL-SCNC: 136 MMOL/L — SIGNIFICANT CHANGE UP (ref 135–145)
SP GR SPEC: 1.02 — SIGNIFICANT CHANGE UP (ref 1.01–1.02)
UROBILINOGEN FLD QL: 8 MG/DL
WBC # BLD: 10.19 K/UL — SIGNIFICANT CHANGE UP (ref 3.8–10.5)
WBC # FLD AUTO: 10.19 K/UL — SIGNIFICANT CHANGE UP (ref 3.8–10.5)
WBC UR QL: SIGNIFICANT CHANGE UP

## 2020-01-30 PROCEDURE — 93010 ELECTROCARDIOGRAM REPORT: CPT

## 2020-01-30 PROCEDURE — 99223 1ST HOSP IP/OBS HIGH 75: CPT

## 2020-01-30 PROCEDURE — 99285 EMERGENCY DEPT VISIT HI MDM: CPT

## 2020-01-30 PROCEDURE — 74177 CT ABD & PELVIS W/CONTRAST: CPT | Mod: 26

## 2020-01-30 RX ORDER — POLYETHYLENE GLYCOL 3350 17 G/17G
17 POWDER, FOR SOLUTION ORAL
Refills: 0 | Status: DISCONTINUED | OUTPATIENT
Start: 2020-01-30 | End: 2020-02-08

## 2020-01-30 RX ORDER — METRONIDAZOLE 500 MG
500 TABLET ORAL EVERY 8 HOURS
Refills: 0 | Status: DISCONTINUED | OUTPATIENT
Start: 2020-01-30 | End: 2020-02-03

## 2020-01-30 RX ORDER — OLANZAPINE 15 MG/1
1 TABLET, FILM COATED ORAL
Qty: 0 | Refills: 0 | DISCHARGE

## 2020-01-30 RX ORDER — SENNA PLUS 8.6 MG/1
2 TABLET ORAL AT BEDTIME
Refills: 0 | Status: DISCONTINUED | OUTPATIENT
Start: 2020-01-30 | End: 2020-02-08

## 2020-01-30 RX ORDER — SODIUM CHLORIDE 9 MG/ML
1000 INJECTION INTRAMUSCULAR; INTRAVENOUS; SUBCUTANEOUS ONCE
Refills: 0 | Status: COMPLETED | OUTPATIENT
Start: 2020-01-30 | End: 2020-01-30

## 2020-01-30 RX ORDER — PANTOPRAZOLE SODIUM 20 MG/1
40 TABLET, DELAYED RELEASE ORAL
Refills: 0 | Status: DISCONTINUED | OUTPATIENT
Start: 2020-01-30 | End: 2020-02-08

## 2020-01-30 RX ORDER — METRONIDAZOLE 500 MG
500 TABLET ORAL ONCE
Refills: 0 | Status: COMPLETED | OUTPATIENT
Start: 2020-01-30 | End: 2020-01-30

## 2020-01-30 RX ORDER — DIPHENHYDRAMINE HCL 50 MG
25 CAPSULE ORAL AT BEDTIME
Refills: 0 | Status: DISCONTINUED | OUTPATIENT
Start: 2020-01-30 | End: 2020-02-08

## 2020-01-30 RX ORDER — ENOXAPARIN SODIUM 100 MG/ML
60 INJECTION SUBCUTANEOUS EVERY 12 HOURS
Refills: 0 | Status: DISCONTINUED | OUTPATIENT
Start: 2020-01-30 | End: 2020-02-08

## 2020-01-30 RX ORDER — DIPHENHYDRAMINE HCL 50 MG
0 CAPSULE ORAL
Qty: 0 | Refills: 0 | DISCHARGE

## 2020-01-30 RX ORDER — CIPROFLOXACIN LACTATE 400MG/40ML
400 VIAL (ML) INTRAVENOUS EVERY 12 HOURS
Refills: 0 | Status: DISCONTINUED | OUTPATIENT
Start: 2020-01-30 | End: 2020-02-03

## 2020-01-30 RX ORDER — DEXTROSE MONOHYDRATE, SODIUM CHLORIDE, AND POTASSIUM CHLORIDE 50; .745; 4.5 G/1000ML; G/1000ML; G/1000ML
1000 INJECTION, SOLUTION INTRAVENOUS
Refills: 0 | Status: DISCONTINUED | OUTPATIENT
Start: 2020-01-30 | End: 2020-02-08

## 2020-01-30 RX ORDER — LANOLIN ALCOHOL/MO/W.PET/CERES
1 CREAM (GRAM) TOPICAL
Qty: 0 | Refills: 0 | DISCHARGE

## 2020-01-30 RX ORDER — OLANZAPINE 15 MG/1
2.5 TABLET, FILM COATED ORAL DAILY
Refills: 0 | Status: DISCONTINUED | OUTPATIENT
Start: 2020-01-30 | End: 2020-02-08

## 2020-01-30 RX ORDER — LANOLIN ALCOHOL/MO/W.PET/CERES
3 CREAM (GRAM) TOPICAL AT BEDTIME
Refills: 0 | Status: DISCONTINUED | OUTPATIENT
Start: 2020-01-30 | End: 2020-02-08

## 2020-01-30 RX ORDER — LACTULOSE 10 G/15ML
30 SOLUTION ORAL
Refills: 0 | Status: DISCONTINUED | OUTPATIENT
Start: 2020-01-30 | End: 2020-02-04

## 2020-01-30 RX ORDER — MINERAL OIL
133 OIL (ML) MISCELLANEOUS
Refills: 0 | Status: COMPLETED | OUTPATIENT
Start: 2020-01-30 | End: 2020-02-02

## 2020-01-30 RX ORDER — CIPROFLOXACIN LACTATE 400MG/40ML
400 VIAL (ML) INTRAVENOUS ONCE
Refills: 0 | Status: COMPLETED | OUTPATIENT
Start: 2020-01-30 | End: 2020-01-30

## 2020-01-30 RX ORDER — ASCORBIC ACID 60 MG
500 TABLET,CHEWABLE ORAL DAILY
Refills: 0 | Status: DISCONTINUED | OUTPATIENT
Start: 2020-01-30 | End: 2020-02-08

## 2020-01-30 RX ORDER — OLANZAPINE 15 MG/1
10 TABLET, FILM COATED ORAL AT BEDTIME
Refills: 0 | Status: DISCONTINUED | OUTPATIENT
Start: 2020-01-30 | End: 2020-02-08

## 2020-01-30 RX ORDER — TEMAZEPAM 15 MG/1
15 CAPSULE ORAL AT BEDTIME
Refills: 0 | Status: DISCONTINUED | OUTPATIENT
Start: 2020-01-30 | End: 2020-02-06

## 2020-01-30 RX ADMIN — LACTULOSE 30 GRAM(S): 10 SOLUTION ORAL at 17:01

## 2020-01-30 RX ADMIN — Medication 3 MILLIGRAM(S): at 22:29

## 2020-01-30 RX ADMIN — Medication 100 MILLIGRAM(S): at 13:59

## 2020-01-30 RX ADMIN — Medication 500 MILLIGRAM(S): at 13:59

## 2020-01-30 RX ADMIN — OLANZAPINE 10 MILLIGRAM(S): 15 TABLET, FILM COATED ORAL at 22:28

## 2020-01-30 RX ADMIN — DEXTROSE MONOHYDRATE, SODIUM CHLORIDE, AND POTASSIUM CHLORIDE 100 MILLILITER(S): 50; .745; 4.5 INJECTION, SOLUTION INTRAVENOUS at 15:34

## 2020-01-30 RX ADMIN — OLANZAPINE 2.5 MILLIGRAM(S): 15 TABLET, FILM COATED ORAL at 13:59

## 2020-01-30 RX ADMIN — SENNA PLUS 2 TABLET(S): 8.6 TABLET ORAL at 22:28

## 2020-01-30 RX ADMIN — Medication 100 MILLIGRAM(S): at 14:00

## 2020-01-30 RX ADMIN — TEMAZEPAM 15 MILLIGRAM(S): 15 CAPSULE ORAL at 22:29

## 2020-01-30 RX ADMIN — Medication 200 MILLIGRAM(S): at 10:21

## 2020-01-30 RX ADMIN — ENOXAPARIN SODIUM 60 MILLIGRAM(S): 100 INJECTION SUBCUTANEOUS at 17:03

## 2020-01-30 RX ADMIN — Medication 400 MILLIGRAM(S): at 11:30

## 2020-01-30 RX ADMIN — Medication 200 MILLIGRAM(S): at 21:32

## 2020-01-30 RX ADMIN — Medication 100 MILLIGRAM(S): at 22:38

## 2020-01-30 RX ADMIN — SODIUM CHLORIDE 1000 MILLILITER(S): 9 INJECTION INTRAMUSCULAR; INTRAVENOUS; SUBCUTANEOUS at 10:20

## 2020-01-30 RX ADMIN — SODIUM CHLORIDE 1000 MILLILITER(S): 9 INJECTION INTRAMUSCULAR; INTRAVENOUS; SUBCUTANEOUS at 05:30

## 2020-01-30 NOTE — ED PROVIDER NOTE - OBJECTIVE STATEMENT
65yo male with pmh of DVT on lovenox, MR, schizophrenia, cerebral palsy presents with diarrhea for 1 day. As per aid at bedside pt has had diarrhea for 1 day. Pt with initially loose stools x3 and earlier this morning with 1 watery foul smelling one. Pt also looked like he was going to vomit, but did not. No fever, cough, changes in urine output, PT at baseline mental status.

## 2020-01-30 NOTE — H&P ADULT - ASSESSMENT
64yr old male with Schizoaffective, Cerebral palsy, mental retardation, Anemia, Anxiety, h/o DVT in past and recent RLE DVT 1 week ago residing in a private home under foster care - OPWD Was brought in by caregiver Lyn Amaral for many episodes of diarrhea since yesterday. Caregiver unavailable to provide details. GILBERTO Trevizo - Provided most of the story. no h/o fever or cough Has been doing ok. Was diagnosed with RLE DVT 1 week ago when he was found to have RLE swelling - was taken to  - found ot have Rt peroneal DVT. In the ER today, he was found to be tachycardic in high 110s -persistently after 2 lit of iv Fluids., Labs s/o mildly elevated transaminases. Ct abd pelvis s/o Rectosigmoid distention with possible impacted stool and stercoral colitis. ER attending Dr Crystal attempting to disimpact.       # Colitis - stercoral - spurious diarrhea vs infectious   received cipro, flagyl one dose - hold off further doses until disimpaction tried   Iv fluids   diet  monitor   Await further clinical course , will call GI > trial of disimpaction    # sinus tachycardia - infectious Vs PE in light of recent DVT vs poor po intake   iv fluids   monitor   R/o PE - CTA chest - discussed with GILBERTO Gant over phone - they are available for consent     # Dysphagia - on pureed diet with thin liquids   # MR - supportive care - Social work and CM consult   #  h/o DVT x 2 - On lovenox for another 3-4days with plan to transition to Xarelto 15BID for 21 days and then lifelong 20mg qd . ct lovenox while here.   # Schizoaffective - ct zyprexa 64yr old male with Schizoaffective, Cerebral palsy, mental retardation, Anemia, Anxiety, h/o DVT in past and recent RLE DVT 1 week ago residing in a private home under foster care - OPWD Was brought in by caregiver Lyn Amaral for many episodes of diarrhea since yesterday. Caregiver unavailable to provide details. GIBLERTO Trevizo - Provided most of the story. no h/o fever or cough Has been doing ok. Was diagnosed with RLE DVT 1 week ago when he was found to have RLE swelling - was taken to  - found ot have Rt peroneal DVT. In the ER today, he was found to be tachycardic in high 110s -persistently after 2 lit of iv Fluids., Labs s/o mildly elevated transaminases. Ct abd pelvis s/o Rectosigmoid distention with possible impacted stool and stercoral colitis. ER attending Dr Crystal attempting to disimpact.       # Colitis - stercoral - spurious diarrhea vs infectious   will ct empiric cipro, flagyl   No e/o disimpaction on rectal exam on ER attending -  GI consult  Iv fluids   diet  monitor   Await further clinical course , will call GI > trial of disimpaction    # sinus tachycardia - infectious Vs PE in light of recent DVT vs poor po intake   iv fluids   monitor   R/o PE - CTA chest - discussed with GILBERTO Gant over phone - they are available for consent     # elevated LFts - Imaging shows Layering of GB stones - no obstruction - repeat LFTs in am - consider Hep panel if conitnues to be elevated.     # Dysphagia - on pureed diet with thin liquids   # MR - supportive care - Social work and CM consult   #  h/o DVT x 2 - On lovenox for another 3-4days with plan to transition to Xarelto 15BID for 21 days and then lifelong 20mg qd . ct lovenox while here.   # Schizoaffective - ct zyprexa 64yr old male with Schizoaffective, Cerebral palsy, mental retardation, Anemia, Anxiety, h/o DVT in past and recent RLE DVT 1 week ago residing in a private home under foster care - OPWD Was brought in by caregiver Lyn Amaral for many episodes of diarrhea since yesterday. Caregiver unavailable to provide details. GILBERTO Trevizo - Provided most of the story. no h/o fever or cough Has been doing ok. Was diagnosed with RLE DVT 1 week ago when he was found to have RLE swelling - was taken to  - found ot have Rt peroneal DVT. In the ER today, he was found to be tachycardic in high 110s -persistently after 2 lit of iv Fluids., Labs s/o mildly elevated transaminases. Ct abd pelvis s/o Rectosigmoid distention with possible impacted stool and stercoral colitis. ER attending Dr Crystal attempting to disimpact.       # Colitis - stercoral - spurious diarrhea vs infectious   will ct empiric cipro, flagyl   No e/o disimpaction on rectal exam on ER attending -  GI consult  Iv fluids   diet  monitor   Await further clinical course , will call GI > trial of disimpaction    # sinus tachycardia - infectious Vs PE in light of recent DVT vs poor po intake   iv fluids   monitor for now   R/o PE Vs aspiration - CTA chest - discussed with GILBERTO Gant over phone - they are available for consent     # elevated LFts - Imaging shows Layering of GB stones - no obstruction - repeat LFTs in am - consider Hep panel if conitnues to be elevated.     # Dysphagia - on pureed diet with thin liquids   # MR - supportive care - Social work and CM consult   #  h/o DVT x 2 - On lovenox for another 3-4days with plan to transition to Xarelto 15BID for 21 days and then lifelong 20mg qd . ct lovenox while here.   # Schizoaffective - ct zyprexa

## 2020-01-30 NOTE — H&P ADULT - NSHPPHYSICALEXAM_GEN_ALL_CORE
PHYSICAL EXAM:    GENERAL: NAD, residual food around lips , makes repetitive unintelligible sounds   HEAD:  Atraumatic, Normocephalic  EYES: EOMI, PERRLA, conjunctiva and sclera clear  ENMT: Moist mucous membranes, poor dentition  NECK: Supple, No JVD  CHEST/LUNG: Clear to percussion bilaterally; No rales, rhonchi  HEART: Regular rate and rhythm; No murmurs, tachycardic+  ABDOMEN: Soft, Nontender, Nondistended; Bowel sounds sluggish   EXTREMITIES:  Rt calf larger than left. No clubbing, cyanosis, or edema - upper and lower extremity contractures+

## 2020-01-30 NOTE — ED PROVIDER NOTE - PHYSICAL EXAMINATION
Const: Awake. In no acute distress. Well appearing.  HEENT: NC/AT. Moist mucous membranes.  Eyes: No scleral icterus. EOMI.  Neck:. Soft and supple. Full ROM without pain.  Cardiac: tachy and regular rhythm. +S1/S2. Peripheral pulses 2+ and symmetric.   Resp: CTAB, No evidence of respiratory distress. No wheezes, rales or rhonchi.  Abd: Soft, non-tender, non-distended. Normal bowel sounds in all 4 quadrants. No guarding or rebound.  Back: Spine midline and non-tender. No CVAT.  Skin: No rashes, abrasions or lacerations.  Lymph: No cervical lymphadenopathy.  Neuro: Awake, at baseline as per aide

## 2020-01-30 NOTE — ED PROVIDER NOTE - CLINICAL SUMMARY MEDICAL DECISION MAKING FREE TEXT BOX
63yo male with pmh of DVT on lovenox, MR, schizophrenia, cerebral palsy presents with diarrhea for 1 day. 63yo male with pmh of DVT on lovenox, MR, schizophrenia, cerebral palsy presents with diarrhea for 1 day - r/o infection and metabolic abn, labs, ct, ivf

## 2020-01-30 NOTE — ED ADULT NURSE REASSESSMENT NOTE - NS ED NURSE REASSESS COMMENT FT1
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 I can follow up with my PMD. MD Mahmood bedside. Pt still stating shes going home. Pt pulled own IV out of arm without telling me.

## 2020-01-30 NOTE — CONSULT NOTE ADULT - SUBJECTIVE AND OBJECTIVE BOX
HISTORY OF PRESENT ILLNESS: This is a 64y old man with a past medical history significant for MR/CP, schizoaffective disorder, anemia, anxiety, and a history of a RLE DVT (one week ago) who resides in a private home under foster care.  He was brought to the ED by his caregiver for multiple episodes of diarrhea that began yesterday.  One week ago, he was noted to have RLE swelling and was taken to [facility] and was found to have a right peroneal DVT.  On presentation to the ED, he was found to be tachycardic with pulse rates in the high 110s that was not responsive to 2 liters of crystalloid infusion.  He underwent a CT A/P that was notable for rectosigmoid distention with possible impacted stool and stercoral colitis.  Disimpaction was attempted by ED physician.  The patient is nonverbal and is unable to provider any history.  There are no caregivers or family members at the bedside.    ROS: Unobtainable in this nonverbal patient    PAST MEDICAL/SURGICAL HISTORY:  Mental retardation  Schizoaffective disorder, bipolar type  Anemia, unspecified type  Spastic quadriplegic cerebral palsy  No significant past surgical history    SOCIAL HISTORY:  - TOBACCO: Denies  - ALCOHOL: Denies  - ILLICIT DRUG USE: Denies    FAMILY HISTORY:  No known history of gastrointestinal or liver disease;  No pertinent family history in first degree relatives      HOME MEDICATIONS:  Colace 100 mg oral capsule: orally once a day (2018 19:07)  diphenhydrAMINE 25 mg oral tablet: orally once a day (at bedtime) (2020 10:51)  ferrous sulfate 200 mg/5 mL oral elixir:  (2018 19:07)  Melatonin 3 mg oral tablet: 1 tab(s) orally once a day (at bedtime) (2020 10:57)  multivitamin: orally once a day (2018 19:07)  OLANZapine 10 mg oral tablet: 1 tab(s) orally once a day (at bedtime) (2020 10:49)  OLANZapine 2.5 mg oral tablet: 1 tab(s) orally once a day (2018 19:07)  omeprazole 20 mg oral delayed release capsule: 1 cap(s) orally once a day (2018 19:07)  polyethylene glycol 3350:  (2018 19:07)  Senna 8.6 mg oral tablet: 2 tab(s) orally once a day (2018 19:07)  temazepam 15 mg oral capsule: 1 cap(s) orally once a day (at bedtime) (2018 19:07)  Vitamin C 500 mg oral capsule: 1 cap(s) orally once a day (2018 19:07)  Vitamin D3 1000 intl units oral capsule: 1 cap(s) orally once a day (2018 19:07)    INPATIENT MEDICATIONS:  MEDICATIONS  (STANDING):  ascorbic acid 500 milliGRAM(s) Oral daily  ciprofloxacin   IVPB 400 milliGRAM(s) IV Intermittent every 12 hours  enoxaparin Injectable 60 milliGRAM(s) SubCutaneous every 12 hours  melatonin 3 milliGRAM(s) Oral at bedtime  metroNIDAZOLE  IVPB 500 milliGRAM(s) IV Intermittent every 8 hours  metroNIDAZOLE  IVPB 500 milliGRAM(s) IV Intermittent Once  OLANZapine 10 milliGRAM(s) Oral at bedtime  OLANZapine 2.5 milliGRAM(s) Oral daily  pantoprazole    Tablet 40 milliGRAM(s) Oral before breakfast  sodium chloride 0.45% with potassium chloride 20 mEq/L 1000 milliLiter(s) (100 mL/Hr) IV Continuous <Continuous>  temazepam 15 milliGRAM(s) Oral at bedtime    MEDICATIONS  (PRN):  diphenhydrAMINE 25 milliGRAM(s) Oral at bedtime PRN Rash and/or Itching    ALLERGIES:  No Known Allergies    T(C): 36.7 (20 @ 11:30), Max: 37.6 (20 @ 04:51)  HR: 104 (20 @ 11:30) (104 - 119)  BP: 126/80 (20 @ 11:30) (126/80 - 158/92)  RR: 20 (20 @ 11:30) (18 - 20)  SpO2: 100% (20 @ 11:30) (94% - 100%)      PHYSICAL EXAM:  Constitutional: Chronically ill, rocking back-and-forth in bed; speech intelligible   Eyes: Sclerae anicteric, conjunctivae normal  ENMT: Mucus membranes moist, no oropharyngeal thrush noted  Neck: No thyroid nodules appreciated, no significant cervical or supraclavicular lymphadenopathy  Respiratory: Breathing nonlabored; clear to auscultation  Cardiovascular: Mildly tachycardic  Gastrointestinal: Firm, tender to deep palpation, nondistended, normoactive bowel sounds; no hepatosplenomegaly appreciated; no rebound tenderness or involuntary guarding  Extremities: No clubbing, cyanosis or edema  Neurological: A&O x 0  Skin: No jaundice  Lymph Nodes: No significant lymphadenopathy  Musculoskeletal: Diffuse peripheral atrophy  Psychiatric: Nonassessable     LABS:             17.7   10.19 )-----------( 242      (  @ 06:07 )             55.4         136  |  98  |  25.0<H>  ----------------------------<  135<H>  5.1   |  23.0  |  0.72    Ca    9.4      2020 06:07  Mg     2.7         TPro  7.4  /  Alb  3.8  /  TBili  0.8  /  DBili  x   /  AST  42<H>  /  ALT  53<H>  /  AlkPhos  220<H>      LIVER FUNCTIONS - ( 2020 06:07 )  Alb: 3.8 g/dL / Pro: 7.4 g/dL / ALK PHOS: 220 U/L / ALT: 53 U/L / AST: 42 U/L / GGT: x             Lipase, Serum: 12 U/L (20 @ 06:07)    Urinalysis Basic - ( 2020 07:34 )    Color: Kourtney / Appearance: Clear / S.020 / pH: x  Gluc: x / Ketone: Trace  / Bili: Small / Urobili: 8 mg/dL   Blood: x / Protein: 30 mg/dL / Nitrite: Negative   Leuk Esterase: Small / RBC: Negative /HPF / WBC 0-2   Sq Epi: x / Non Sq Epi: Negative / Bacteria: Negative    IMAGING: I personally reviewed the CT A/P, and I agree with the radiologist's interpretation as described below:    < from: CT Abdomen and Pelvis w/ IV Cont (20 @ 08:32) >  FINDINGS:    LOWER CHEST: There are no pleural or pericardial effusions. The heart size is normal. There is coronary artery calcification. The imaged thoracic aorta and main pulmonary artery are normal in caliber. There is a large hiatal hernia. The imaged lungs are limited by motion but demonstrate patchy groundglass opacity in a perihilar and bibasilar distribution, which may be of infectious or inflammatory etiology.    LIVER: Within normal limits.  BILE DUCTS: Normal caliber.  GALLBLADDER: Multiple punctate calcified layering gallstones.  SPLEEN: Within normal limits.  PANCREAS: Within normal limits.  ADRENALS: Within normal limits.  KIDNEYS/URETERS: No renal stones or hydronephrosis. Symmetric enhancement. There are bilateral subcentimeter hypodensities, too small to further characterize. There are bilateral nonobstructing renal calculi, more numerous on the right where they measure up to 6 mm. There is mild right hydroureteronephrosis without definite obstructing calculus. This is likely secondary to the presence of a markedly distended rectosigmoid with stool.    BLADDER: Partially distended and displaced anteriorly by marked distended stool-filled rectosigmoid.  REPRODUCTIVE ORGANS: Prostate within normal limits.    BOWEL: No bowel obstruction. There is marked distention of the rectosigmoid with stool, measuring up to 12.5 x 8.6 cm. There is diffuse circumferential wall thickening of the rectum and distal sigmoid colon, likely representing stercoral colitis. Appendix is within normal limits.  PERITONEUM: No ascites.  VESSELS: The aorta and IVC are normal in caliber and patent.  RETROPERITONEUM/LYMPH NODES: No lymphadenopathy.    ABDOMINAL WALL: Scattered foci of subcutaneous air is likely related to subcutaneous injection sites.  BONES: Multilevel degenerative change of the thoracolumbar spine. Old fracture deformity of the left proximal femur.    IMPRESSION:     Marked distention of the rectosigmoid with stool with diffuse circumferential wall thickening, most compatible with fecal impaction and stercoral colitis.    Mild right hydronephrosis is likely secondary to the markedly distended rectosigmoid.    Multiple bilateral nonobstructing renal calculi.    Patchy groundglass opacity in the lung bases is likely infectious or inflammatory in etiology.    < end of copied text >

## 2020-01-30 NOTE — ED ADULT NURSE REASSESSMENT NOTE - NS ED NURSE REASSESS COMMENT FT1
Received report from GILBERTO Barba. Pt aox4, breathing equal and unlabored, sinus tach on cm. Pt POC explained and verbalized understanding. Pt awaiting CT results.

## 2020-01-30 NOTE — H&P ADULT - HISTORY OF PRESENT ILLNESS
64yr old male with Schizoaffective, Cerebral palsy, mental retardation, Anemia, Anxiety, h/o DVT in past and recent RLE DVT 1 week ago residing in a private home under foster care - OPWD Was brought in by caregiver Lyn Amaral for many episodes of diarrhea since yesterday. Caregiver unavailable to provide details. RN Alfreda Trevizo - Provided most of the story. no h/o fever or cough Has been doing ok. Was diagnosed with RLE DVT 1 week ago when he was found to have RLE swelling - was taken to  - found ot have Rt peroneal DVT. In the ER today, he was found to be tachycardic in high 110s -persistently after 2 lit of iv Fluids., Labs s/o mildly elevated transaminases. Ct abd pelvis s/o Rectosigmoid distention with possible impacted stool and stercoral colitis. ER attending Dr Crystal attempting to disimpact.

## 2020-01-30 NOTE — ED PROVIDER NOTE - PROGRESS NOTE DETAILS
Given the significant and immediate threats to this patient based on initial presentation, the benefits of  contrast-enhanced CT imaging without being able to obtain consent from pt due to baseline mental status greatly outweigh the potential risk of harm due to contrast-induced nephropathy.

## 2020-01-30 NOTE — ED ADULT NURSE NOTE - NSIMPLEMENTINTERV_GEN_ALL_ED
Implemented All Fall Risk Interventions:  Transfer to call system. Call bell, personal items and telephone within reach. Instruct patient to call for assistance. Room bathroom lighting operational. Non-slip footwear when patient is off stretcher. Physically safe environment: no spills, clutter or unnecessary equipment. Stretcher in lowest position, wheels locked, appropriate side rails in place. Provide visual cue, wrist band, yellow gown, etc. Monitor gait and stability. Monitor for mental status changes and reorient to person, place, and time. Review medications for side effects contributing to fall risk. Reinforce activity limits and safety measures with patient and family.

## 2020-01-30 NOTE — ED ADULT NURSE REASSESSMENT NOTE - NS ED NURSE REASSESS COMMENT FT1
MD. Azevedo called to inform that pt. had a loose BM, pt. does not need enema or Miralax. ok to hold for tonight and see how pt. is tomorrow.

## 2020-01-30 NOTE — ED ADULT NURSE NOTE - OBJECTIVE STATEMENT
Patient BIBA c/o diarrhea x2 days. Patient is developmentally delayed, pt is tachycardic and tachypneic, . Patient presents to ED A/Ox4, VSS, denies chest pain or sob.  Respirations are even and unlabored, lungs cta, +bowel x4 quads, abdomen soft, nontender/nondistended, skin w/d/i.  Currently on Lovenox 50 mg for DVT

## 2020-01-30 NOTE — ED PROVIDER NOTE - NS ED MD DISPO ISOLATION TYPES
I called pt to convey results. I left a message that pt will need to call back  I will pass along results and try to get him into GI as his Colonoscopy was poor prep and likely needed repeating.    Pt has anemia and will refer to GI      
None

## 2020-01-30 NOTE — ED ADULT NURSE REASSESSMENT NOTE - NS ED NURSE REASSESS COMMENT FT1
pt. received from night RN. pt. awake, pt. non-verbal. pt. in no apparent distress at this time, breathing even and unlabored. pt. on CM. sinus tachy. MD. Crystal advised, pt. is receiving fluids at this time. awaiting CT.

## 2020-01-30 NOTE — H&P ADULT - NSICDXPASTMEDICALHX_GEN_ALL_CORE_FT
PAST MEDICAL HISTORY:  Anemia, unspecified type     Mental retardation     Schizoaffective disorder, bipolar type     Spastic quadriplegic cerebral palsy

## 2020-01-31 LAB
ALBUMIN SERPL ELPH-MCNC: 3 G/DL — LOW (ref 3.3–5.2)
ALP SERPL-CCNC: 159 U/L — HIGH (ref 40–120)
ALT FLD-CCNC: 30 U/L — SIGNIFICANT CHANGE UP
ANION GAP SERPL CALC-SCNC: 11 MMOL/L — SIGNIFICANT CHANGE UP (ref 5–17)
ANISOCYTOSIS BLD QL: SLIGHT — SIGNIFICANT CHANGE UP
AST SERPL-CCNC: 23 U/L — SIGNIFICANT CHANGE UP
BASOPHILS # BLD AUTO: 0.07 K/UL — SIGNIFICANT CHANGE UP (ref 0–0.2)
BASOPHILS NFR BLD AUTO: 1.8 % — SIGNIFICANT CHANGE UP (ref 0–2)
BILIRUB SERPL-MCNC: 0.6 MG/DL — SIGNIFICANT CHANGE UP (ref 0.4–2)
BUN SERPL-MCNC: 19 MG/DL — SIGNIFICANT CHANGE UP (ref 8–20)
CALCIUM SERPL-MCNC: 8.7 MG/DL — SIGNIFICANT CHANGE UP (ref 8.6–10.2)
CHLORIDE SERPL-SCNC: 102 MMOL/L — SIGNIFICANT CHANGE UP (ref 98–107)
CO2 SERPL-SCNC: 22 MMOL/L — SIGNIFICANT CHANGE UP (ref 22–29)
CREAT SERPL-MCNC: 0.6 MG/DL — SIGNIFICANT CHANGE UP (ref 0.5–1.3)
EOSINOPHIL # BLD AUTO: 0 K/UL — SIGNIFICANT CHANGE UP (ref 0–0.5)
EOSINOPHIL NFR BLD AUTO: 0 % — SIGNIFICANT CHANGE UP (ref 0–6)
GIANT PLATELETS BLD QL SMEAR: PRESENT — SIGNIFICANT CHANGE UP
GLUCOSE SERPL-MCNC: 128 MG/DL — HIGH (ref 70–99)
HCT VFR BLD CALC: 44.5 % — SIGNIFICANT CHANGE UP (ref 39–50)
HCV AB S/CO SERPL IA: 0.19 S/CO — SIGNIFICANT CHANGE UP (ref 0–0.99)
HCV AB SERPL-IMP: SIGNIFICANT CHANGE UP
HGB BLD-MCNC: 14.3 G/DL — SIGNIFICANT CHANGE UP (ref 13–17)
LYMPHOCYTES # BLD AUTO: 0.46 K/UL — LOW (ref 1–3.3)
LYMPHOCYTES # BLD AUTO: 11.4 % — LOW (ref 13–44)
MACROCYTES BLD QL: SLIGHT — SIGNIFICANT CHANGE UP
MANUAL SMEAR VERIFICATION: SIGNIFICANT CHANGE UP
MCHC RBC-ENTMCNC: 29.6 PG — SIGNIFICANT CHANGE UP (ref 27–34)
MCHC RBC-ENTMCNC: 32.1 GM/DL — SIGNIFICANT CHANGE UP (ref 32–36)
MCV RBC AUTO: 92.1 FL — SIGNIFICANT CHANGE UP (ref 80–100)
MICROCYTES BLD QL: SLIGHT — SIGNIFICANT CHANGE UP
MONOCYTES # BLD AUTO: 0.42 K/UL — SIGNIFICANT CHANGE UP (ref 0–0.9)
MONOCYTES NFR BLD AUTO: 10.5 % — SIGNIFICANT CHANGE UP (ref 2–14)
MYELOCYTES NFR BLD: 2.6 % — HIGH (ref 0–0)
NEUTROPHILS # BLD AUTO: 2.79 K/UL — SIGNIFICANT CHANGE UP (ref 1.8–7.4)
NEUTROPHILS NFR BLD AUTO: 69.3 % — SIGNIFICANT CHANGE UP (ref 43–77)
PLAT MORPH BLD: ABNORMAL
PLATELET # BLD AUTO: 142 K/UL — LOW (ref 150–400)
PLATELET COUNT - ESTIMATE: ABNORMAL
POIKILOCYTOSIS BLD QL AUTO: SLIGHT — SIGNIFICANT CHANGE UP
POTASSIUM SERPL-MCNC: 4.3 MMOL/L — SIGNIFICANT CHANGE UP (ref 3.5–5.3)
POTASSIUM SERPL-SCNC: 4.3 MMOL/L — SIGNIFICANT CHANGE UP (ref 3.5–5.3)
PROT SERPL-MCNC: 5.7 G/DL — LOW (ref 6.6–8.7)
RBC # BLD: 4.83 M/UL — SIGNIFICANT CHANGE UP (ref 4.2–5.8)
RBC # FLD: 13.5 % — SIGNIFICANT CHANGE UP (ref 10.3–14.5)
RBC BLD AUTO: ABNORMAL
SODIUM SERPL-SCNC: 135 MMOL/L — SIGNIFICANT CHANGE UP (ref 135–145)
VARIANT LYMPHS # BLD: 4.4 % — SIGNIFICANT CHANGE UP (ref 0–6)
WBC # BLD: 4.03 K/UL — SIGNIFICANT CHANGE UP (ref 3.8–10.5)
WBC # FLD AUTO: 4.03 K/UL — SIGNIFICANT CHANGE UP (ref 3.8–10.5)

## 2020-01-31 PROCEDURE — 99232 SBSQ HOSP IP/OBS MODERATE 35: CPT | Mod: GC

## 2020-01-31 PROCEDURE — 99233 SBSQ HOSP IP/OBS HIGH 50: CPT

## 2020-01-31 PROCEDURE — 99232 SBSQ HOSP IP/OBS MODERATE 35: CPT

## 2020-01-31 RX ADMIN — Medication 200 MILLIGRAM(S): at 17:40

## 2020-01-31 RX ADMIN — ENOXAPARIN SODIUM 60 MILLIGRAM(S): 100 INJECTION SUBCUTANEOUS at 17:31

## 2020-01-31 RX ADMIN — POLYETHYLENE GLYCOL 3350 17 GRAM(S): 17 POWDER, FOR SOLUTION ORAL at 17:31

## 2020-01-31 RX ADMIN — Medication 200 MILLIGRAM(S): at 06:47

## 2020-01-31 RX ADMIN — PANTOPRAZOLE SODIUM 40 MILLIGRAM(S): 20 TABLET, DELAYED RELEASE ORAL at 07:01

## 2020-01-31 RX ADMIN — Medication 3 MILLIGRAM(S): at 22:18

## 2020-01-31 RX ADMIN — Medication 100 MILLIGRAM(S): at 14:57

## 2020-01-31 RX ADMIN — Medication 133 MILLILITER(S): at 17:31

## 2020-01-31 RX ADMIN — SENNA PLUS 2 TABLET(S): 8.6 TABLET ORAL at 22:18

## 2020-01-31 RX ADMIN — TEMAZEPAM 15 MILLIGRAM(S): 15 CAPSULE ORAL at 22:18

## 2020-01-31 RX ADMIN — LACTULOSE 30 GRAM(S): 10 SOLUTION ORAL at 17:31

## 2020-01-31 RX ADMIN — DEXTROSE MONOHYDRATE, SODIUM CHLORIDE, AND POTASSIUM CHLORIDE 100 MILLILITER(S): 50; .745; 4.5 INJECTION, SOLUTION INTRAVENOUS at 02:10

## 2020-01-31 RX ADMIN — Medication 100 MILLIGRAM(S): at 22:19

## 2020-01-31 RX ADMIN — ENOXAPARIN SODIUM 60 MILLIGRAM(S): 100 INJECTION SUBCUTANEOUS at 06:53

## 2020-01-31 RX ADMIN — OLANZAPINE 10 MILLIGRAM(S): 15 TABLET, FILM COATED ORAL at 22:19

## 2020-01-31 RX ADMIN — Medication 500 MILLIGRAM(S): at 12:13

## 2020-01-31 RX ADMIN — Medication 100 MILLIGRAM(S): at 06:51

## 2020-01-31 RX ADMIN — OLANZAPINE 2.5 MILLIGRAM(S): 15 TABLET, FILM COATED ORAL at 12:12

## 2020-01-31 NOTE — CONSULT NOTE ADULT - SUBJECTIVE AND OBJECTIVE BOX
Colorectal Surgery Consult Note    Patient is a 64y old  Male who presents with a chief complaint of Diarrhea (2020 14:35)      HPI: 64 year old male with PMH significant for mental retardation, anemia, schizoaffective disorder, bipolar, quadriplegic cerebral palsy presenting to [facility] ED on  from his care takers home after multiple bouts of diarrhea were observed. ED workup revealed massive rectosigmoid fecal impaction and stercoral colitis. ED Dr. Crystal was unsuccessful in his attempt at bedside disimpaction. GI consulted recommended aggressive bowel regimen. Patient currently on Cipro and Flagyl for stercoral colitis.            ROS: 10-system review is otherwise negative except HPI above.      PAST MEDICAL & SURGICAL HISTORY:  Mental retardation  Schizoaffective disorder, bipolar type  Anemia, unspecified type  Spastic quadriplegic cerebral palsy  No significant past surgical history    FAMILY HISTORY:  No pertinent family history in first degree relatives    [] Family history not pertinent as reviewed with the patient and family    SOCIAL HISTORY:  unable to obtain     ALLERGIES: No Known Allergies      CURRENT MEDICATIONS  MEDICATIONS (STANDING): ascorbic acid 500 milliGRAM(s) Oral daily  ciprofloxacin   IVPB 400 milliGRAM(s) IV Intermittent every 12 hours  enoxaparin Injectable 60 milliGRAM(s) SubCutaneous every 12 hours  lactulose Syrup 30 Gram(s) Oral two times a day  melatonin 3 milliGRAM(s) Oral at bedtime  metroNIDAZOLE  IVPB 500 milliGRAM(s) IV Intermittent every 8 hours  mineral oil enema 133 milliLiter(s) Rectal two times a day  OLANZapine 10 milliGRAM(s) Oral at bedtime  OLANZapine 2.5 milliGRAM(s) Oral daily  pantoprazole    Tablet 40 milliGRAM(s) Oral before breakfast  polyethylene glycol 3350 17 Gram(s) Oral two times a day  senna 2 Tablet(s) Oral at bedtime  sodium chloride 0.45% with potassium chloride 20 mEq/L 1000 milliLiter(s) IV Continuous <Continuous>  temazepam 15 milliGRAM(s) Oral at bedtime    MEDICATIONS (PRN):diphenhydrAMINE 25 milliGRAM(s) Oral at bedtime PRN Rash and/or Itching    --------------------------------------------------------------------------------------------    Vitals:   T(C): 36.2 (20 @ 18:30), Max: 36.8 (20 @ 08:06)  HR: 105 (20 @ 18:30) (89 - 109)  BP: 155/95 (20 @ 18:30) (133/80 - 155/95)  RR: 20 (20 @ 18:30) (18 - 20)  SpO2: 98% (20 @ 18:30) (96% - 98%)  CAPILLARY BLOOD GLUCOSE        CAPILLARY BLOOD GLUCOSE           @ 07:01  -   @ 07:00  --------------------------------------------------------  IN:  Total IN: 0 mL    OUT:    Voided: 1 mL  Total OUT: 1 mL    Total NET: -1 mL       @ 07:01  -   @ 19:56  --------------------------------------------------------  IN:  Total IN: 0 mL    OUT:    Voided: 2 mL  Total OUT: 2 mL    Total NET: -2 mL        Height (cm): 170.18 ( @ 04:21)  Weight (kg): 56.7 ( @ 04:21)  BMI (kg/m2): 19.6 ( @ 04:21)  BSA (m2): 1.66 ( 04:21)    PHYSICAL EXAM:  General: NAD, Lying in bed comfortably  HEENT: NC/AT, EOMI  Neck: Soft, supple  Cardio: RRR, nml S1/S2  Resp: Good effort, CTA b/l  Thorax: No chest wall tenderness  GI/Abd: firmness palpated lower abdomen, tenderness illicited  by patient's reaction  : JAVIER with palpable firm but soft mass high in the rectal vault beyond reach of finger. Unable to disimpact, soft liquid stool noted, no bleeding    Vascular: All 4 extremities warm.  Skin: Intact, no breakdown    --------------------------------------------------------------------------------------------    LABS  CBC ( @ 07:56)                              14.3                           4.03    )----------------(  142<L>     69.3  % Neutrophils, 11.4<L>% Lymphocytes, ANC: 2.79                                44.5      BMP ( @ 07:56)             135     |  102     |  19.0  		Ca++ --      Ca 8.7                ---------------------------------( 128<H>		Mg --                 4.3     |  22.0    |  0.60  			Ph --        LFTs ( @ 07:56)      TPro 5.7<L> / Alb 3.0<L> / TBili 0.6 / DBili -- / AST 23 / ALT 30 / AlkPhos 159<H>            --------------------------------------------------------------------------------------------    MICROBIOLOGY  Urinalysis ( @ 07:34):     Color: Kourtney<!> / Appearance: Clear / S.020 / pH: 5.0 / Gluc: Negative / Ketones: Trace<!> / Bili: Small<!> / Urobili: 8<!> / Protein :30<!> / Nitrites: Negative / Leuk.Est: Small<!> / RBC: Negative / WBC: 0-2 / Sq Epi:  / Non Sq Epi: Negative / Bacteria Negative   Rare amorphous present.      --------------------------------------------------------------------------------------------    IMAGING  < from: CT Abdomen and Pelvis w/ IV Cont (20 @ 08:32) >  BOWEL: No bowel obstruction. There is marked distention of the rectosigmoid with stool, measuring up to 12.5 x 8.6 cm. There is diffuse circumferential wall thickening of the rectum and distal sigmoid colon, likely representing stercoral colitis. Appendix is within normal limits.  PERITONEUM: No ascites.  VESSELS: The aorta and IVC are normal in caliber and patent.  RETROPERITONEUM/LYMPH NODES: No lymphadenopathy.    ABDOMINAL WALL: Scattered foci of subcutaneous air is likely related to subcutaneous injection sites.  BONES: Multilevel degenerative change of the thoracolumbar spine. Old fracture deformity of the left proximal femur.    IMPRESSION:     Marked distention of the rectosigmoid with stool with diffuse circumferential wall thickening, most compatible with fecal impaction and stercoral colitis.    Mild right hydronephrosis is likely secondary to the markedly distended rectosigmoid.    Multiple bilateral nonobstructing renal calculi.    < end of copied text >

## 2020-01-31 NOTE — PROGRESS NOTE ADULT - ASSESSMENT
64yr old male with Schizoaffective, Cerebral palsy, mental retardation, Anemia, Anxiety, h/o DVT in past and recent RLE DVT 1 week ago residing in a private home under foster care - OPWDD, was brought in by caregiver Lyn Amaral for many episodes of diarrhea. Was diagnosed with RLE DVT 1 week ago when he was found to have RLE swelling - was taken to  - found to have Rt peroneal DVT. In the ER, he was found to be tachycardic in high 110s -persistently after 2 liters of iv Fluids. Labs s/o mildly elevated transaminases. Ct abd pelvis s/o Rectosigmoid distention with possible impacted stool and stercoral colitis. ER attending Dr Crystal attempted to disimpact and was unsuccessful.       # Colitis - stercoral - spurious diarrhea vs infectious   will ct empiric cipro, flagyl   GI consulted and unable to disimpact  Iv fluids   diet  monitor   Surgical consulted for disimpaction    # sinus tachycardia - infectious Vs PE in light of recent DVT vs poor po intake   iv fluids   monitor for now   Continue abx.  No need for CTA Chest as patient already on AC.     # elevated LFTs - Imaging shows Layering of GB stones - no obstruction - repeat LFTs improved.     # Dysphagia - on pureed diet with thin liquids   # MR - supportive care - Social work and CM consult   #  h/o DVT x 2 - On Lovenox for another 3-4days with plan to transition to Xarelto 15BID for 21 days and then lifelong 20mg qd . Lovenox continued while here.   # Schizoaffective - Continue Zyprexa

## 2020-01-31 NOTE — PROGRESS NOTE ADULT - SUBJECTIVE AND OBJECTIVE BOX
Chief Complaint: This is a 64y old man patient being seen in follow-up consultation for fecal impaction.    HPI / 24H events:  Though very difficult to discern, the patient seemed to report feeling better today.  Has been passing liquid stool.  Seems to deny any abdominal pain.    ROS: Not obtainable in this patient.    PAST MEDICAL/SURGICAL HISTORY:  Mental retardation  Schizoaffective disorder, bipolar type  Anemia, unspecified type  Spastic quadriplegic cerebral palsy  No significant past surgical history    MEDICATIONS  (STANDING):  ascorbic acid 500 milliGRAM(s) Oral daily  ciprofloxacin   IVPB 400 milliGRAM(s) IV Intermittent every 12 hours  enoxaparin Injectable 60 milliGRAM(s) SubCutaneous every 12 hours  lactulose Syrup 30 Gram(s) Oral two times a day  melatonin 3 milliGRAM(s) Oral at bedtime  metroNIDAZOLE  IVPB 500 milliGRAM(s) IV Intermittent every 8 hours  mineral oil enema 133 milliLiter(s) Rectal two times a day  OLANZapine 10 milliGRAM(s) Oral at bedtime  OLANZapine 2.5 milliGRAM(s) Oral daily  pantoprazole    Tablet 40 milliGRAM(s) Oral before breakfast  polyethylene glycol 3350 17 Gram(s) Oral two times a day  senna 2 Tablet(s) Oral at bedtime  sodium chloride 0.45% with potassium chloride 20 mEq/L 1000 milliLiter(s) (100 mL/Hr) IV Continuous <Continuous>  temazepam 15 milliGRAM(s) Oral at bedtime    MEDICATIONS  (PRN):  diphenhydrAMINE 25 milliGRAM(s) Oral at bedtime PRN Rash and/or Itching    No Known Allergies    T(C): 36.8 (01-31-20 @ 08:06), Max: 36.8 (01-31-20 @ 08:06)  HR: 89 (01-31-20 @ 08:06) (89 - 116)  BP: 142/82 (01-31-20 @ 08:06) (131/93 - 142/82)  RR: 18 (01-31-20 @ 08:06) (18 - 18)  SpO2: 97% (01-31-20 @ 01:21) (96% - 98%)    I&O's Summary    30 Jan 2020 07:01  -  31 Jan 2020 07:00  --------------------------------------------------------  IN: 0 mL / OUT: 1 mL / NET: -1 mL    31 Jan 2020 07:01  -  31 Jan 2020 14:36  --------------------------------------------------------  IN: 0 mL / OUT: 2 mL / NET: -2 mL      PHYSICAL EXAM:  Constitutional: Chronically ill, rocking back-and-forth in bed; speech intelligible   Eyes: Sclerae anicteric, conjunctivae normal  ENMT: Mucus membranes moist, no oropharyngeal thrush noted  Neck: No thyroid nodules appreciated, no significant cervical or supraclavicular lymphadenopathy  Respiratory: Breathing nonlabored; clear to auscultation  Cardiovascular: Mildly tachycardic  Gastrointestinal: Soft, nontender, nondistended, normoactive bowel sounds; no hepatosplenomegaly appreciated; no rebound tenderness or involuntary guarding  Extremities: No clubbing, cyanosis or edema  Neurological: A&O x 0  Skin: No jaundice  Lymph Nodes: No significant lymphadenopathy  Musculoskeletal: Diffuse peripheral atrophy  Psychiatric: Nonassessable                    14.3   4.03  )-----------( 142      ( 01-31 @ 07:56 )             44.5                17.7   10.19 )-----------( 242      ( 01-30 @ 06:07 )             55.4       01-31    135  |  102  |  19.0  ----------------------------<  128<H>  4.3   |  22.0  |  0.60    Ca    8.7      31 Jan 2020 07:56  Mg     2.7     01-30    TPro  5.7<L>  /  Alb  3.0<L>  /  TBili  0.6  /  DBili  x   /  AST  23  /  ALT  30  /  AlkPhos  159<H>  01-31    LIVER FUNCTIONS - ( 31 Jan 2020 07:56 )  Alb: 3.0 g/dL / Pro: 5.7 g/dL / ALK PHOS: 159 U/L / ALT: 30 U/L / AST: 23 U/L / GGT: x             Lipase, Serum: 12 U/L (01-30-20 @ 06:07)

## 2020-01-31 NOTE — CONSULT NOTE ADULT - ATTENDING COMMENTS
CT scan reviewed and significant impaction of the rectosigmoid colon. No evidence of bowel obstruction. Continue aggressive bowel regimen

## 2020-01-31 NOTE — ED ADULT NURSE REASSESSMENT NOTE - NS ED NURSE REASSESS COMMENT FT1
Report given to RN 3TWR. Pt baseline mental status, breathing equal and unlabored, sinus tach on cm. Pt POC explained to pt, pt non verbal. Pt transferred to 3TWR.

## 2020-01-31 NOTE — PROGRESS NOTE ADULT - ASSESSMENT
Patient presenting with diarrhea, C. diff negative.  Likely overflow diarrhea secondary to underlying stercoral colitis.  Agree with antibiotics for colitis to prevent bacterial translocation into blood stream.     Recommendations:  - Recommend 2-view KUB to assess impaction  - Mineral oil enemas BID  - MiraLAX BID  - Senna 2 tabs QHS  - lactulose 30 BID  - Continue antibiotics  - No indication for endoscopic evaluation at this time    Thank you for the consult.  Please do not hesitate to call with any questions or concerns.    SAEED Aldrich MD  [facility]  Division of Gastroenterology  Tel [phone]  Fax [phone]  01-31-20 @ 14:40 vomiting

## 2020-01-31 NOTE — PROGRESS NOTE ADULT - SUBJECTIVE AND OBJECTIVE BOX
CC: Diarrhea (2020 13:31)    HPI:  64yr old male with Schizoaffective, Cerebral palsy, mental retardation, Anemia, Anxiety, h/o DVT in past and recent RLE DVT 1 week ago residing in a private home under foster care - [facility] was brought in by caregiver Lyn Amaral for many episodes of diarrhea. Was diagnosed with RLE DVT 1 week ago when he was found to have RLE swelling - was taken to  - found to have Rt peroneal DVT. In the ER, he was found to be tachycardic in high 110s -persistently after 2 liters of iv Fluids, Labs s/o mildly elevated transaminases. Ct abd pelvis s/o Rectosigmoid distention with possible impacted stool and stercoral colitis. ER attending Dr Crystal attempted to disimpact but unsuccessful. (2020 11:05)    INTERVAL HPI/OVERNIGHT EVENTS: Patient seen and examined lying in bed.  Patient with unclear speech and unable to answer ROS.  Small BM this am as per RN.       Vital Signs Last 24 Hrs  T(C): 36.8 (2020 08:06), Max: 36.8 (2020 08:06)  T(F): 98.2 (2020 08:06), Max: 98.2 (2020 08:06)  HR: 89 (2020 08:06) (89 - 116)  BP: 142/82 (2020 08:06) (131/93 - 142/82)  BP(mean): --  RR: 18 (2020 08:06) (18 - 18)  SpO2: 97% (2020 01:21) (96% - 98%)  I&O's Detail    2020 07:01  -  2020 07:00  --------------------------------------------------------  IN:  Total IN: 0 mL    OUT:    Voided: 1 mL  Total OUT: 1 mL    Total NET: -1 mL      2020 07:01  -  2020 14:22  --------------------------------------------------------  IN:  Total IN: 0 mL    OUT:    Voided: 2 mL  Total OUT: 2 mL    Total NET: -2 mL      PHYSICAL EXAM:  GENERAL: NAD  HEAD:  Atraumatic, Normocephalic  NECK: Supple, No JVD, Normal thyroid  NERVOUS SYSTEM:  Alert, unclear speech, generalized weakness  CHEST/LUNG: Clear to auscultation bilaterally; No rales, rhonchi, wheezing, or rubs  HEART: Regular rate and rhythm; No murmurs, rubs, or gallops  ABDOMEN: Soft, Nontender, Nondistended; Bowel sounds present  EXTREMITIES:  2+ Peripheral Pulses, No clubbing, cyanosis, or edema                              14.3   4.03  )-----------( 142      ( 2020 07:56 )             44.5     2020 07:56    135    |  102    |  19.0   ----------------------------<  128    4.3     |  22.0   |  0.60     Ca    8.7        2020 07:56  Mg     2.7       2020 06:07    TPro  5.7    /  Alb  3.0    /  TBili  0.6    /  DBili  x      /  AST  23     /  ALT  30     /  AlkPhos  159    2020 07:56      LIVER FUNCTIONS - ( 2020 07:56 )  Alb: 3.0 g/dL / Pro: 5.7 g/dL / ALK PHOS: 159 U/L / ALT: 30 U/L / AST: 23 U/L / GGT: x           Urinalysis Basic - ( 2020 07:34 )    Color: Kourtney / Appearance: Clear / S.020 / pH: x  Gluc: x / Ketone: Trace  / Bili: Small / Urobili: 8 mg/dL   Blood: x / Protein: 30 mg/dL / Nitrite: Negative   Leuk Esterase: Small / RBC: Negative /HPF / WBC 0-2   Sq Epi: x / Non Sq Epi: Negative / Bacteria: Negative        MEDICATIONS  (STANDING):  ascorbic acid 500 milliGRAM(s) Oral daily  ciprofloxacin   IVPB 400 milliGRAM(s) IV Intermittent every 12 hours  enoxaparin Injectable 60 milliGRAM(s) SubCutaneous every 12 hours  lactulose Syrup 30 Gram(s) Oral two times a day  melatonin 3 milliGRAM(s) Oral at bedtime  metroNIDAZOLE  IVPB 500 milliGRAM(s) IV Intermittent every 8 hours  mineral oil enema 133 milliLiter(s) Rectal two times a day  OLANZapine 10 milliGRAM(s) Oral at bedtime  OLANZapine 2.5 milliGRAM(s) Oral daily  pantoprazole    Tablet 40 milliGRAM(s) Oral before breakfast  polyethylene glycol 3350 17 Gram(s) Oral two times a day  senna 2 Tablet(s) Oral at bedtime  sodium chloride 0.45% with potassium chloride 20 mEq/L 1000 milliLiter(s) (100 mL/Hr) IV Continuous <Continuous>  temazepam 15 milliGRAM(s) Oral at bedtime    MEDICATIONS  (PRN):  diphenhydrAMINE 25 milliGRAM(s) Oral at bedtime PRN Rash and/or Itching      RADIOLOGY & ADDITIONAL TESTS:  < from: CT Abdomen and Pelvis w/ IV Cont (20 @ 08:32) >   EXAM:  CT ABDOMEN AND PELVIS IC                          PROCEDURE DATE:  2020          INTERPRETATION:  CLINICAL INFORMATION: Diarrhea.    COMPARISON: CT abdomen pelvis 2018.    PROCEDURE:   CT of the Abdomen and Pelvis was performed with intravenous contrast.   Intravenous contrast: 90 ml Omnipaque 350. 10 ml discarded.  Oral contrast: positive contrast was administered.  Sagittal and coronal reformats were performed.    FINDINGS:    LOWER CHEST: There are no pleural or pericardial effusions. The heart size is normal. There is coronary artery calcification. The imaged thoracic aorta and main pulmonary artery are normal in caliber. There is a large hiatal hernia. The imaged lungs are limited by motion but demonstrate patchy groundglass opacity in a perihilar and bibasilar distribution, which may be of infectious or inflammatory etiology.    LIVER: Within normal limits.  BILE DUCTS: Normal caliber.  GALLBLADDER: Multiple punctate calcified layering gallstones.  SPLEEN: Within normal limits.  PANCREAS: Within normal limits.  ADRENALS: Within normal limits.  KIDNEYS/URETERS: No renal stones or hydronephrosis. Symmetric enhancement. There are bilateral subcentimeter hypodensities, too small to further characterize. There are bilateral nonobstructing renal calculi, more numerous on the right where they measure up to 6 mm. There is mild right hydroureteronephrosis without definite obstructing calculus. This is likely secondary to the presence of a markedly distended rectosigmoid with stool.    BLADDER: Partially distended and displaced anteriorly by marked distended stool-filled rectosigmoid.  REPRODUCTIVE ORGANS: Prostate within normal limits.    BOWEL: No bowel obstruction. There is marked distention of the rectosigmoid with stool, measuring up to 12.5 x 8.6 cm. There is diffuse circumferential wall thickening of the rectum and distal sigmoid colon, likely representing stercoral colitis. Appendix is within normal limits.  PERITONEUM: No ascites.  VESSELS: The aorta and IVC are normal in caliber and patent.  RETROPERITONEUM/LYMPH NODES: No lymphadenopathy.    ABDOMINAL WALL: Scattered foci of subcutaneous air is likely related to subcutaneous injection sites.  BONES: Multilevel degenerative change of the thoracolumbar spine. Old fracture deformity of the left proximal femur.    IMPRESSION:     Marked distention of the rectosigmoid with stool with diffuse circumferential wall thickening, most compatible with fecal impaction and stercoral colitis.    Mild right hydronephrosis is likely secondary to the markedly distended rectosigmoid.    Multiple bilateral nonobstructing renal calculi.    Patchy groundglass opacity in the lung bases is likely infectious or inflammatory in etiology.                    VARGHESE SIERRA M.D. ATTENDING RADIOLOGIST  This document has been electronically signed. 2020  9:00AM                < end of copied text >

## 2020-01-31 NOTE — CONSULT NOTE ADULT - ASSESSMENT
ASSESSMENT: Patient is a 64y old m with rectosigmoid fecal impaction and stercoral colitis     PLAN:    - Unable to successfully disimpact as stool mass beyond reach    - recommend continued aggressive bowel regimen, may add Tap water enemas TID  - serial DREs with continued attempts to disimpact stool  - Colorectal surgery to continue to follow and monitor patient  - Plan discussed with Attending, Dr. Chatterjee ASSESSMENT: Patient is a 64y old m with rectosigmoid fecal impaction and stercoral colitis     PLAN:    - Unable to successfully disimpact as stool mass beyond reach    - recommend continued aggressive bowel regimen, may add Tap water enemas TID  - recommend Golytely   - serial DREs with continued attempts to disimpact stool  - Colorectal surgery to continue to follow and monitor patient  - Plan discussed with Attending, Dr. Chatterjee ASSESSMENT: Patient is a 64y old m with rectosigmoid fecal impaction and stercoral colitis     PLAN:    - Unable to successfully disimpact as stool mass beyond reach    - recommend continued aggressive bowel regimen, may add Tap water enemas TID  - recommend Golytely   - serial DREs with continued attempts to disimpact stool  - KUB in AM   - Colorectal surgery to continue to follow and monitor patient  - Plan discussed with Attending, Dr. Chatterjee

## 2020-02-01 DIAGNOSIS — K56.41 FECAL IMPACTION: ICD-10-CM

## 2020-02-01 LAB
ANION GAP SERPL CALC-SCNC: 14 MMOL/L — SIGNIFICANT CHANGE UP (ref 5–17)
BUN SERPL-MCNC: 8 MG/DL — SIGNIFICANT CHANGE UP (ref 8–20)
CALCIUM SERPL-MCNC: 9.1 MG/DL — SIGNIFICANT CHANGE UP (ref 8.6–10.2)
CHLORIDE SERPL-SCNC: 103 MMOL/L — SIGNIFICANT CHANGE UP (ref 98–107)
CO2 SERPL-SCNC: 22 MMOL/L — SIGNIFICANT CHANGE UP (ref 22–29)
CREAT SERPL-MCNC: 0.49 MG/DL — LOW (ref 0.5–1.3)
GLUCOSE SERPL-MCNC: 96 MG/DL — SIGNIFICANT CHANGE UP (ref 70–99)
HCT VFR BLD CALC: 44.6 % — SIGNIFICANT CHANGE UP (ref 39–50)
HGB BLD-MCNC: 14.7 G/DL — SIGNIFICANT CHANGE UP (ref 13–17)
MCHC RBC-ENTMCNC: 30.1 PG — SIGNIFICANT CHANGE UP (ref 27–34)
MCHC RBC-ENTMCNC: 33 GM/DL — SIGNIFICANT CHANGE UP (ref 32–36)
MCV RBC AUTO: 91.2 FL — SIGNIFICANT CHANGE UP (ref 80–100)
PLATELET # BLD AUTO: 138 K/UL — LOW (ref 150–400)
POTASSIUM SERPL-MCNC: 3.7 MMOL/L — SIGNIFICANT CHANGE UP (ref 3.5–5.3)
POTASSIUM SERPL-SCNC: 3.7 MMOL/L — SIGNIFICANT CHANGE UP (ref 3.5–5.3)
RBC # BLD: 4.89 M/UL — SIGNIFICANT CHANGE UP (ref 4.2–5.8)
RBC # FLD: 13.1 % — SIGNIFICANT CHANGE UP (ref 10.3–14.5)
SODIUM SERPL-SCNC: 139 MMOL/L — SIGNIFICANT CHANGE UP (ref 135–145)
WBC # BLD: 3.4 K/UL — LOW (ref 3.8–10.5)
WBC # FLD AUTO: 3.4 K/UL — LOW (ref 3.8–10.5)

## 2020-02-01 PROCEDURE — 99232 SBSQ HOSP IP/OBS MODERATE 35: CPT

## 2020-02-01 PROCEDURE — 99233 SBSQ HOSP IP/OBS HIGH 50: CPT

## 2020-02-01 PROCEDURE — 74018 RADEX ABDOMEN 1 VIEW: CPT | Mod: 26

## 2020-02-01 RX ORDER — SOD SULF/SODIUM/NAHCO3/KCL/PEG
4000 SOLUTION, RECONSTITUTED, ORAL ORAL ONCE
Refills: 0 | Status: COMPLETED | OUTPATIENT
Start: 2020-02-01 | End: 2020-02-01

## 2020-02-01 RX ADMIN — ENOXAPARIN SODIUM 60 MILLIGRAM(S): 100 INJECTION SUBCUTANEOUS at 05:32

## 2020-02-01 RX ADMIN — TEMAZEPAM 15 MILLIGRAM(S): 15 CAPSULE ORAL at 22:11

## 2020-02-01 RX ADMIN — LACTULOSE 30 GRAM(S): 10 SOLUTION ORAL at 17:19

## 2020-02-01 RX ADMIN — OLANZAPINE 2.5 MILLIGRAM(S): 15 TABLET, FILM COATED ORAL at 11:00

## 2020-02-01 RX ADMIN — LACTULOSE 30 GRAM(S): 10 SOLUTION ORAL at 05:32

## 2020-02-01 RX ADMIN — Medication 133 MILLILITER(S): at 17:18

## 2020-02-01 RX ADMIN — Medication 100 MILLIGRAM(S): at 14:22

## 2020-02-01 RX ADMIN — Medication 4000 MILLILITER(S): at 09:33

## 2020-02-01 RX ADMIN — ENOXAPARIN SODIUM 60 MILLIGRAM(S): 100 INJECTION SUBCUTANEOUS at 17:20

## 2020-02-01 RX ADMIN — Medication 100 MILLIGRAM(S): at 05:32

## 2020-02-01 RX ADMIN — Medication 100 MILLIGRAM(S): at 22:11

## 2020-02-01 RX ADMIN — OLANZAPINE 10 MILLIGRAM(S): 15 TABLET, FILM COATED ORAL at 22:11

## 2020-02-01 RX ADMIN — Medication 3 MILLIGRAM(S): at 22:11

## 2020-02-01 RX ADMIN — Medication 200 MILLIGRAM(S): at 17:18

## 2020-02-01 RX ADMIN — POLYETHYLENE GLYCOL 3350 17 GRAM(S): 17 POWDER, FOR SOLUTION ORAL at 17:19

## 2020-02-01 RX ADMIN — SENNA PLUS 2 TABLET(S): 8.6 TABLET ORAL at 22:11

## 2020-02-01 RX ADMIN — POLYETHYLENE GLYCOL 3350 17 GRAM(S): 17 POWDER, FOR SOLUTION ORAL at 05:32

## 2020-02-01 RX ADMIN — PANTOPRAZOLE SODIUM 40 MILLIGRAM(S): 20 TABLET, DELAYED RELEASE ORAL at 05:32

## 2020-02-01 RX ADMIN — Medication 133 MILLILITER(S): at 05:32

## 2020-02-01 RX ADMIN — Medication 500 MILLIGRAM(S): at 11:00

## 2020-02-01 RX ADMIN — Medication 200 MILLIGRAM(S): at 05:32

## 2020-02-01 NOTE — PROGRESS NOTE ADULT - SUBJECTIVE AND OBJECTIVE BOX
Pt seen and examined. CRS note from yesterday appreciated. Golytely ordered for today. Manual fecal disimpaction unsuccessful.     REVIEW OF SYSTEMS:  Unobtainable in this patient.      MEDICATIONS:  MEDICATIONS  (STANDING):  ascorbic acid 500 milliGRAM(s) Oral daily  ciprofloxacin   IVPB 400 milliGRAM(s) IV Intermittent every 12 hours  enoxaparin Injectable 60 milliGRAM(s) SubCutaneous every 12 hours  lactulose Syrup 30 Gram(s) Oral two times a day  melatonin 3 milliGRAM(s) Oral at bedtime  metroNIDAZOLE  IVPB 500 milliGRAM(s) IV Intermittent every 8 hours  mineral oil enema 133 milliLiter(s) Rectal two times a day  OLANZapine 10 milliGRAM(s) Oral at bedtime  OLANZapine 2.5 milliGRAM(s) Oral daily  pantoprazole    Tablet 40 milliGRAM(s) Oral before breakfast  polyethylene glycol 3350 17 Gram(s) Oral two times a day  polyethylene glycol/electrolyte Solution. 4000 milliLiter(s) Oral once  senna 2 Tablet(s) Oral at bedtime  sodium chloride 0.45% with potassium chloride 20 mEq/L 1000 milliLiter(s) (100 mL/Hr) IV Continuous <Continuous>  temazepam 15 milliGRAM(s) Oral at bedtime    MEDICATIONS  (PRN):  diphenhydrAMINE 25 milliGRAM(s) Oral at bedtime PRN Rash and/or Itching      Allergies    No Known Allergies    Intolerances        Vital Signs Last 24 Hrs  T(C): 37.3 (31 Jan 2020 23:09), Max: 37.3 (31 Jan 2020 23:09)  T(F): 99.2 (31 Jan 2020 23:09), Max: 99.2 (31 Jan 2020 23:09)  HR: 89 (31 Jan 2020 23:09) (89 - 105)  BP: 107/68 (31 Jan 2020 23:09) (107/68 - 155/95)  BP(mean): --  RR: 20 (31 Jan 2020 23:09) (20 - 20)  SpO2: 97% (31 Jan 2020 23:09) (97% - 98%)    01-31 @ 07:01  -  02-01 @ 07:00  --------------------------------------------------------  IN: 500 mL / OUT: 3 mL / NET: 497 mL        PHYSICAL EXAM:    General: Well developed; well nourished; in no acute distress  HEENT: MMM, conjunctiva pink  and sclera anicteric.  Lungs: clear to auscultation bilaterally.  Cor: RRR S1, S2 only.  Gastrointestinal: Abdomen: Soft non-tender non-distended; Normal bowel sounds; No hepatosplenomegaly.  Extremities: Contracted  Skin: Warm and dry. No obvious rash      LABS:  CBC Full  -  ( 31 Jan 2020 07:56 )  WBC Count : 4.03 K/uL  RBC Count : 4.83 M/uL  Hemoglobin : 14.3 g/dL  Hematocrit : 44.5 %  Platelet Count - Automated : 142 K/uL  Mean Cell Volume : 92.1 fl  Mean Cell Hemoglobin : 29.6 pg  Mean Cell Hemoglobin Concentration : 32.1 gm/dL  Auto Neutrophil # : 2.79 K/uL  Auto Lymphocyte # : 0.46 K/uL  Auto Monocyte # : 0.42 K/uL  Auto Eosinophil # : 0.00 K/uL  Auto Basophil # : 0.07 K/uL  Auto Neutrophil % : 69.3 %  Auto Lymphocyte % : 11.4 %  Auto Monocyte % : 10.5 %  Auto Eosinophil % : 0.0 %  Auto Basophil % : 1.8 %    01-31    135  |  102  |  19.0  ----------------------------<  128<H>  4.3   |  22.0  |  0.60    Ca    8.7      31 Jan 2020 07:56    TPro  5.7<L>  /  Alb  3.0<L>  /  TBili  0.6  /  DBili  x   /  AST  23  /  ALT  30  /  AlkPhos  159<H>  01-31                      RADIOLOGY & ADDITIONAL STUDIES (The following images were personally reviewed):

## 2020-02-01 NOTE — PROGRESS NOTE ADULT - SUBJECTIVE AND OBJECTIVE BOX
CC: stool impaction     INTERVAL HPI/OVERNIGHT EVENTS: seen and examined  , still having loose stools but abdomen is firm .   ordered golytely .  unable to obtain ROS due to mentation           Vital Signs Last 24 Hrs  T(C): 37.3 (31 Jan 2020 23:09), Max: 37.3 (31 Jan 2020 23:09)  T(F): 99.2 (31 Jan 2020 23:09), Max: 99.2 (31 Jan 2020 23:09)  HR: 89 (31 Jan 2020 23:09) (89 - 105)  BP: 107/68 (31 Jan 2020 23:09) (107/68 - 155/95)  BP(mean): --  RR: 20 (31 Jan 2020 23:09) (20 - 20)  SpO2: 97% (31 Jan 2020 23:09) (97% - 98%)    PHYSICAL EXAM:    GENERAL: NAD, resting comfortable in bed , incomprehensible speech   CHEST/LUNG: CTAB , no wheezing , rales, rhonchi heard   HEART: S1S2+, Regular rate and rhythm; No murmurs, rubs, or gallops  ABDOMEN: Soft, Nontender, distended;firm  Bowel sounds present  EXTREMITIES:  no pitting edema , pulses palpated BL.        LABS:                        14.7   3.40  )-----------( 138      ( 01 Feb 2020 09:56 )             44.6     02-01    139  |  103  |  8.0  ----------------------------<  96  3.7   |  22.0  |  0.49<L>    Ca    9.1      01 Feb 2020 09:56    TPro  5.7<L>  /  Alb  3.0<L>  /  TBili  0.6  /  DBili  x   /  AST  23  /  ALT  30  /  AlkPhos  159<H>  01-31            MEDICATIONS  (STANDING):  ascorbic acid 500 milliGRAM(s) Oral daily  ciprofloxacin   IVPB 400 milliGRAM(s) IV Intermittent every 12 hours  enoxaparin Injectable 60 milliGRAM(s) SubCutaneous every 12 hours  lactulose Syrup 30 Gram(s) Oral two times a day  melatonin 3 milliGRAM(s) Oral at bedtime  metroNIDAZOLE  IVPB 500 milliGRAM(s) IV Intermittent every 8 hours  mineral oil enema 133 milliLiter(s) Rectal two times a day  OLANZapine 10 milliGRAM(s) Oral at bedtime  OLANZapine 2.5 milliGRAM(s) Oral daily  pantoprazole    Tablet 40 milliGRAM(s) Oral before breakfast  polyethylene glycol 3350 17 Gram(s) Oral two times a day  senna 2 Tablet(s) Oral at bedtime  sodium chloride 0.45% with potassium chloride 20 mEq/L 1000 milliLiter(s) (100 mL/Hr) IV Continuous <Continuous>  temazepam 15 milliGRAM(s) Oral at bedtime    MEDICATIONS  (PRN):  diphenhydrAMINE 25 milliGRAM(s) Oral at bedtime PRN Rash and/or Itching      RADIOLOGY & ADDITIONAL TESTS:

## 2020-02-01 NOTE — PROGRESS NOTE ADULT - SUBJECTIVE AND OBJECTIVE BOX
No acute events reported overnight. Having liquid stool. Unable to ellicit ROS.     Exam:  Vital Signs Last 24 Hrs  T(C): 37.3 (31 Jan 2020 23:09), Max: 37.3 (31 Jan 2020 23:09)  T(F): 99.2 (31 Jan 2020 23:09), Max: 99.2 (31 Jan 2020 23:09)  HR: 89 (31 Jan 2020 23:09) (89 - 105)  BP: 107/68 (31 Jan 2020 23:09) (107/68 - 155/95)  RR: 20 (31 Jan 2020 23:09) (20 - 20)  SpO2: 97% (31 Jan 2020 23:09) (97% - 98%)    In no distress  Non labored breathing  Abdomen soft, non tender  Rectal: No mass, liquid stool noted, formed stool felt at the tip of fingers, not accessible for disimpaction                            14.7   3.40  )-----------( 138      ( 01 Feb 2020 09:56 )             44.6   02-01    139  |  103  |  8.0  ----------------------------<  96  3.7   |  22.0  |  0.49<L>    Ca    9.1      01 Feb 2020 09:56    TPro  5.7<L>  /  Alb  3.0<L>  /  TBili  0.6  /  DBili  x   /  AST  23  /  ALT  30  /  AlkPhos  159<H>  01-31    Abdominal xray: large stool in the rectosigmoid colon

## 2020-02-01 NOTE — PROGRESS NOTE ADULT - ASSESSMENT
64yr old male with Schizoaffective, Cerebral palsy, mental retardation, Anemia, Anxiety, h/o DVT in past and recent RLE DVT 1 week ago residing in a private home under foster care - [facility] was brought in by caregiver Lyn Amaral for many episodes of diarrhea. Was diagnosed with RLE DVT 1 week ago when he was found to have RLE swelling - was taken to  - found to have Rt peroneal DVT. In the ER, he was found to be tachycardic in high 110s -persistently after 2 liters of iv Fluids, Labs s/o mildly elevated transaminases. Ct abd pelvis s/o Rectosigmoid distention with possible impacted stool and stercoral colitis.     1. Constipation /stercoral colitis   seen by GI and surgery   disimpaction unsuccessful .  on bowel regimen , laxatives , enema .  will monitor closely .  can stop antibiotics in 24 hours.     2. Hx of DVT twice   on lovenox.  once stable should be able to switch to newer AC .    3. Dehydration   improved.  monitor.    4. Cerebral palsy/schizoaffective disorder   around his baseline .  continue with home regimen .    5. DVT prophylaxis  on lovenox     no family at bedside .

## 2020-02-01 NOTE — PROGRESS NOTE ADULT - ASSESSMENT
64 year old male with fecal impaction of the rectosigmoid colon not accessible for disimpaction with fingers from below. Abdominal exam is benign and he is stable. Continue bowel regimen from above and below with enemas. Hopefully some of the stool can migrate down to allow disimpaction.

## 2020-02-02 LAB
ANION GAP SERPL CALC-SCNC: 15 MMOL/L — SIGNIFICANT CHANGE UP (ref 5–17)
BUN SERPL-MCNC: 8 MG/DL — SIGNIFICANT CHANGE UP (ref 8–20)
CALCIUM SERPL-MCNC: 9.3 MG/DL — SIGNIFICANT CHANGE UP (ref 8.6–10.2)
CHLORIDE SERPL-SCNC: 105 MMOL/L — SIGNIFICANT CHANGE UP (ref 98–107)
CO2 SERPL-SCNC: 21 MMOL/L — LOW (ref 22–29)
CREAT SERPL-MCNC: 0.47 MG/DL — LOW (ref 0.5–1.3)
GLUCOSE SERPL-MCNC: 107 MG/DL — HIGH (ref 70–99)
HCT VFR BLD CALC: 45.9 % — SIGNIFICANT CHANGE UP (ref 39–50)
HGB BLD-MCNC: 15.3 G/DL — SIGNIFICANT CHANGE UP (ref 13–17)
MCHC RBC-ENTMCNC: 30 PG — SIGNIFICANT CHANGE UP (ref 27–34)
MCHC RBC-ENTMCNC: 33.3 GM/DL — SIGNIFICANT CHANGE UP (ref 32–36)
MCV RBC AUTO: 90 FL — SIGNIFICANT CHANGE UP (ref 80–100)
PLATELET # BLD AUTO: 152 K/UL — SIGNIFICANT CHANGE UP (ref 150–400)
POTASSIUM SERPL-MCNC: 3.6 MMOL/L — SIGNIFICANT CHANGE UP (ref 3.5–5.3)
POTASSIUM SERPL-SCNC: 3.6 MMOL/L — SIGNIFICANT CHANGE UP (ref 3.5–5.3)
RBC # BLD: 5.1 M/UL — SIGNIFICANT CHANGE UP (ref 4.2–5.8)
RBC # FLD: 13 % — SIGNIFICANT CHANGE UP (ref 10.3–14.5)
SODIUM SERPL-SCNC: 141 MMOL/L — SIGNIFICANT CHANGE UP (ref 135–145)
WBC # BLD: 4.02 K/UL — SIGNIFICANT CHANGE UP (ref 3.8–10.5)
WBC # FLD AUTO: 4.02 K/UL — SIGNIFICANT CHANGE UP (ref 3.8–10.5)

## 2020-02-02 PROCEDURE — 99233 SBSQ HOSP IP/OBS HIGH 50: CPT

## 2020-02-02 PROCEDURE — 99231 SBSQ HOSP IP/OBS SF/LOW 25: CPT | Mod: GC

## 2020-02-02 RX ADMIN — OLANZAPINE 10 MILLIGRAM(S): 15 TABLET, FILM COATED ORAL at 21:37

## 2020-02-02 RX ADMIN — ENOXAPARIN SODIUM 60 MILLIGRAM(S): 100 INJECTION SUBCUTANEOUS at 17:06

## 2020-02-02 RX ADMIN — SENNA PLUS 2 TABLET(S): 8.6 TABLET ORAL at 21:37

## 2020-02-02 RX ADMIN — TEMAZEPAM 15 MILLIGRAM(S): 15 CAPSULE ORAL at 21:38

## 2020-02-02 RX ADMIN — Medication 100 MILLIGRAM(S): at 05:07

## 2020-02-02 RX ADMIN — POLYETHYLENE GLYCOL 3350 17 GRAM(S): 17 POWDER, FOR SOLUTION ORAL at 05:07

## 2020-02-02 RX ADMIN — ENOXAPARIN SODIUM 60 MILLIGRAM(S): 100 INJECTION SUBCUTANEOUS at 05:07

## 2020-02-02 RX ADMIN — Medication 133 MILLILITER(S): at 05:07

## 2020-02-02 RX ADMIN — Medication 200 MILLIGRAM(S): at 05:07

## 2020-02-02 RX ADMIN — LACTULOSE 30 GRAM(S): 10 SOLUTION ORAL at 05:07

## 2020-02-02 RX ADMIN — Medication 200 MILLIGRAM(S): at 17:05

## 2020-02-02 RX ADMIN — LACTULOSE 30 GRAM(S): 10 SOLUTION ORAL at 17:06

## 2020-02-02 RX ADMIN — Medication 100 MILLIGRAM(S): at 21:38

## 2020-02-02 RX ADMIN — Medication 3 MILLIGRAM(S): at 21:37

## 2020-02-02 RX ADMIN — Medication 100 MILLIGRAM(S): at 13:19

## 2020-02-02 RX ADMIN — Medication 500 MILLIGRAM(S): at 11:10

## 2020-02-02 RX ADMIN — OLANZAPINE 2.5 MILLIGRAM(S): 15 TABLET, FILM COATED ORAL at 11:10

## 2020-02-02 RX ADMIN — POLYETHYLENE GLYCOL 3350 17 GRAM(S): 17 POWDER, FOR SOLUTION ORAL at 17:06

## 2020-02-02 RX ADMIN — PANTOPRAZOLE SODIUM 40 MILLIGRAM(S): 20 TABLET, DELAYED RELEASE ORAL at 05:07

## 2020-02-02 NOTE — PROGRESS NOTE ADULT - SUBJECTIVE AND OBJECTIVE BOX
Colorectal Surgery Progress Note:  No acute overnight events. Patient afebrile, VSS. Pain well controlled. Remains distended. Denies n/v/f/c/cp/sob.     Medications:   ascorbic acid 500 milliGRAM(s) Oral daily  ciprofloxacin   IVPB 400 milliGRAM(s) IV Intermittent every 12 hours  enoxaparin Injectable 60 milliGRAM(s) SubCutaneous every 12 hours  lactulose Syrup 30 Gram(s) Oral two times a day  melatonin 3 milliGRAM(s) Oral at bedtime  metroNIDAZOLE  IVPB 500 milliGRAM(s) IV Intermittent every 8 hours  OLANZapine 10 milliGRAM(s) Oral at bedtime  OLANZapine 2.5 milliGRAM(s) Oral daily  pantoprazole    Tablet 40 milliGRAM(s) Oral before breakfast  polyethylene glycol 3350 17 Gram(s) Oral two times a day  senna 2 Tablet(s) Oral at bedtime  sodium chloride 0.45% with potassium chloride 20 mEq/L 1000 milliLiter(s) (100 mL/Hr) IV Continuous <Continuous>  temazepam 15 milliGRAM(s) Oral at bedtime    diphenhydrAMINE 25 milliGRAM(s) Oral at bedtime PRN Rash and/or Itching          Objective:   T(F): 98.5 (02-01 @ 16:26), Max: 98.5 (02-01 @ 16:26)  HR: 90 (02-02 @ 05:15) (90 - 95)  BP: 140/88 (02-02 @ 05:15) (140/88 - 147/91)  BP(mean): --  ABP: --  ABP(mean): --  RR: 18 (02-02 @ 05:15) (18 - 18)  SpO2: 98% (02-02 @ 05:15) (95% - 98%)      Physical Exam:   GEN: patient resting comfortably in bed, in no acute distress  RESP: respirations are unlabored, no accessory muscle use, no conversational dyspnea  CVS: RRR  GI: Abdomen soft, non-tender, distended, no rebound tenderness / guarding    I&O's    01-31 @ 07:01  -  02-01 @ 07:00  --------------------------------------------------------  IN:    IV PiggyBack: 100 mL    Oral Fluid: 400 mL  Total IN: 500 mL    OUT:    Voided: 3 mL  Total OUT: 3 mL    Total NET: 497 mL      02-01 @ 07:01  -  02-02 @ 06:32  --------------------------------------------------------  IN:    IV PiggyBack: 400 mL    Oral Fluid: 480 mL  Total IN: 880 mL    OUT:  Total OUT: 0 mL    Total NET: 880 mL          LABS:                        14.7   3.40  )-----------( 138      ( 01 Feb 2020 09:56 )             44.6     02-01    139  |  103  |  8.0  ----------------------------<  96  3.7   |  22.0  |  0.49<L>    Ca    9.1      01 Feb 2020 09:56    TPro  5.7<L>  /  Alb  3.0<L>  /  TBili  0.6  /  DBili  x   /  AST  23  /  ALT  30  /  AlkPhos  159<H>  01-31          MICROBIOLOGY:       PATHOLOGY:

## 2020-02-02 NOTE — DIETITIAN INITIAL EVALUATION ADULT. - ETIOLOGY
related to inadequate protein calorie intake in the setting of psychiatric d/o requiring feeding and difficulty swallowing

## 2020-02-02 NOTE — DIETITIAN INITIAL EVALUATION ADULT. - PERTINENT LABORATORY DATA
02-02 Na141 mmol/L Glu 107 mg/dL<H> K+ 3.6 mmol/L Cr  0.47 mg/dL<L> BUN 8.0 mg/dL Phos n/a   Alb n/a   PAB n/a

## 2020-02-02 NOTE — PROGRESS NOTE ADULT - ASSESSMENT
64yr old male with Schizoaffective, Cerebral palsy, mental retardation, Anemia, Anxiety, h/o DVT in past and recent RLE DVT 1 week ago residing in a private home under foster care - [facility]WDD was brought in by caregiver Lyn Amaral for many episodes of diarrhea. Was diagnosed with RLE DVT 1 week ago when he was found to have RLE swelling - was taken to  - found to have Rt peroneal DVT. In the ER, he was found to be tachycardic in high 110s -persistently after 2 liters of iv Fluids, Labs s/o mildly elevated transaminases. Ct abd pelvis s/o Rectosigmoid distention with possible impacted stool and stercoral colitis.     1. Constipation /stercoral colitis   seen by GI and colorectal surgery   disimpaction unsuccessful .  on bowel regimen , laxatives , enema .  will monitor closely .  can stop antibiotics    2. Hx of DVT twice   on lovenox.  once stable should be able to switch to NOAC .    3. Dehydration   improved.  monitor.    4. Cerebral palsy/schizoaffective disorder   around his baseline .  continue with home regimen .    5. DVT prophylaxis  on lovenox     dispo: unclear. once fecal has been resolved

## 2020-02-02 NOTE — DIETITIAN INITIAL EVALUATION ADULT. - OTHER INFO
Per MD note:  64yr old male with Schizoaffective, Cerebral palsy, mental retardation, Anemia, Anxiety, h/o DVT in past and recent RLE DVT 1 week ago residing in a private home under foster care - [facility]WDD was brought in by caregiver Lyn Amaral for many episodes of diarrhea. Was diagnosed with RLE DVT 1 week ago when he was found to have RLE swelling - was taken to  - found to have Rt peroneal DVT. In the ER, he was found to be tachycardic in high 110s -persistently after 2 liters of iv Fluids, Labs s/o mildly elevated transaminases. Ct abd pelvis s/o Rectosigmoid distention with possible impacted stool and stercoral colitis.    Constipation /stercoral colitis   seen by GI and colorectal surgery   disimpaction unsuccessful .  on bowel regimen , laxatives , enema .   Hx of DVT twice    Dehydration   improved.   Cerebral palsy/schizoaffective disorder   around his baseline .  Pt found in bed. Difficult to understand. not responding to questions. unable to obtain hx. no family at bedside  PO % per flow sheets on puree with thin. pt is a complete feed  physical signs of malnutrition [ ]absent [x ]present. [ ]Mild [ ]xModerate [ ]Severe Muscle wasting present at [ x]clavicle [ ]interosseos [ ]calf. [ ]Mild [x ]Moderate [ ]Severe subcutaneous fat loss presents at [ ]ribs [x]orbital region [ ]triceps x[ ]buccal area.

## 2020-02-02 NOTE — PROGRESS NOTE ADULT - ASSESSMENT
65yo male with fecal impaction of the rectosigmoid colon not accessible for disimpaction with fingers from below. Abdominal exam is benign and he is stable. Continue bowel regimen from above and below with enemas. Hopefully some of the stool can migrate down to allow disimpaction.

## 2020-02-02 NOTE — PROGRESS NOTE ADULT - SUBJECTIVE AND OBJECTIVE BOX
CC: stool impaction     INTERVAL HPI/OVERNIGHT EVENTS:   seen and examined  patient is continuously talking, incomprehensible  answered no when asked about chest and abd pain  unsuccessful disimpaction. multiple laxatives    ROS:  unable to obtain    Vital Signs Last 24 Hrs  T(C): 36.9 (01 Feb 2020 16:26), Max: 36.9 (01 Feb 2020 16:26)  T(F): 98.5 (01 Feb 2020 16:26), Max: 98.5 (01 Feb 2020 16:26)  HR: 90 (02 Feb 2020 05:15) (90 - 95)  BP: 140/88 (02 Feb 2020 05:15) (140/88 - 147/91)  BP(mean): --  RR: 18 (02 Feb 2020 05:15) (18 - 18)  SpO2: 98% (02 Feb 2020 05:15) (95% - 98%)    CAPILLARY BLOOD GLUCOSE      I&O's Summary    01 Feb 2020 07:01  -  02 Feb 2020 07:00  --------------------------------------------------------  IN: 880 mL / OUT: 0 mL / NET: 880 mL        GENERAL: Not in distress. Alert    HEENT:  Normocephalic and atraumatic. PEARLA  NECK: Supple.   CARDIOVASCULAR: RRR S1, S2. No murmur/rubs/gallop  LUNGS: BLAE+, no rales, no wheezing, no rhonchi.    ABDOMEN: ND. Soft,  NT, no guarding / rebound / rigidity. BS normoactive. No CVA tenderness.    EXTREMITIES: no cyanosis, no clubbing, no edema. no leg or calf TP  SKIN: warm and dry  NEUROLOGIC: alert. incomprehensible speech. moved limbs . flexion deformity b/l UE  PSYCHIATRIC: Calm.  No agitation.          LABS:                                   15.3   4.02  )-----------( 152      ( 02 Feb 2020 07:35 )             45.9   02-01    139  |  103  |  8.0  ----------------------------<  96  3.7   |  22.0  |  0.49<L>    Ca    9.1      01 Feb 2020 09:56      MEDICATIONS  (STANDING):  ascorbic acid 500 milliGRAM(s) Oral daily  ciprofloxacin   IVPB 400 milliGRAM(s) IV Intermittent every 12 hours  enoxaparin Injectable 60 milliGRAM(s) SubCutaneous every 12 hours  lactulose Syrup 30 Gram(s) Oral two times a day  melatonin 3 milliGRAM(s) Oral at bedtime  metroNIDAZOLE  IVPB 500 milliGRAM(s) IV Intermittent every 8 hours  OLANZapine 10 milliGRAM(s) Oral at bedtime  OLANZapine 2.5 milliGRAM(s) Oral daily  pantoprazole    Tablet 40 milliGRAM(s) Oral before breakfast  polyethylene glycol 3350 17 Gram(s) Oral two times a day  senna 2 Tablet(s) Oral at bedtime  sodium chloride 0.45% with potassium chloride 20 mEq/L 1000 milliLiter(s) (100 mL/Hr) IV Continuous <Continuous>  temazepam 15 milliGRAM(s) Oral at bedtime    MEDICATIONS  (PRN):  diphenhydrAMINE 25 milliGRAM(s) Oral at bedtime PRN Rash and/or Itching      < from: Xray Kidney Ureter Bladder (02.01.20 @ 05:36) >  FINDINGS:    There is fecal impaction withinthe rectosigmoid colon with air distended transverse colon.. There is no free intra-abdominal air.  There are no obvious intra-abdominal masses.  There are no abnormal calcifications.   The osseous structures are intact.     IMPRESSION:    Fecal impaction with proximal air distended bowel.    < end of copied text >        RADIOLOGY & ADDITIONAL TESTS:

## 2020-02-02 NOTE — CHART NOTE - NSCHARTNOTEFT_GEN_A_CORE
Upon Nutritional Assessment by the Registered Dietitian your patient was determined to meet criteria / has evidence of the following diagnosis/diagnoses:          [ x]  Mild Protein Calorie Malnutrition     Findings as based on:  •  Comprehensive nutrition assessment and consultation      Treatment:    The following diet has been recommended:    1) ensure TID  PROVIDER Section:     By signing this assessment you are acknowledging and agree with the diagnosis/diagnoses assigned by the Registered Dietitian    Comments:

## 2020-02-03 PROCEDURE — 99232 SBSQ HOSP IP/OBS MODERATE 35: CPT

## 2020-02-03 PROCEDURE — 99233 SBSQ HOSP IP/OBS HIGH 50: CPT

## 2020-02-03 PROCEDURE — 74018 RADEX ABDOMEN 1 VIEW: CPT | Mod: 26

## 2020-02-03 RX ORDER — MINERAL OIL
133 OIL (ML) MISCELLANEOUS
Refills: 0 | Status: COMPLETED | OUTPATIENT
Start: 2020-02-03 | End: 2020-02-06

## 2020-02-03 RX ADMIN — Medication 133 MILLILITER(S): at 18:06

## 2020-02-03 RX ADMIN — ENOXAPARIN SODIUM 60 MILLIGRAM(S): 100 INJECTION SUBCUTANEOUS at 17:13

## 2020-02-03 RX ADMIN — TEMAZEPAM 15 MILLIGRAM(S): 15 CAPSULE ORAL at 21:38

## 2020-02-03 RX ADMIN — Medication 500 MILLIGRAM(S): at 11:50

## 2020-02-03 RX ADMIN — Medication 3 MILLIGRAM(S): at 21:38

## 2020-02-03 RX ADMIN — OLANZAPINE 10 MILLIGRAM(S): 15 TABLET, FILM COATED ORAL at 21:38

## 2020-02-03 RX ADMIN — ENOXAPARIN SODIUM 60 MILLIGRAM(S): 100 INJECTION SUBCUTANEOUS at 05:11

## 2020-02-03 RX ADMIN — Medication 200 MILLIGRAM(S): at 05:10

## 2020-02-03 RX ADMIN — POLYETHYLENE GLYCOL 3350 17 GRAM(S): 17 POWDER, FOR SOLUTION ORAL at 05:11

## 2020-02-03 RX ADMIN — Medication 100 MILLIGRAM(S): at 05:10

## 2020-02-03 RX ADMIN — LACTULOSE 30 GRAM(S): 10 SOLUTION ORAL at 17:20

## 2020-02-03 RX ADMIN — LACTULOSE 30 GRAM(S): 10 SOLUTION ORAL at 05:11

## 2020-02-03 RX ADMIN — OLANZAPINE 2.5 MILLIGRAM(S): 15 TABLET, FILM COATED ORAL at 11:49

## 2020-02-03 RX ADMIN — SENNA PLUS 2 TABLET(S): 8.6 TABLET ORAL at 21:38

## 2020-02-03 RX ADMIN — PANTOPRAZOLE SODIUM 40 MILLIGRAM(S): 20 TABLET, DELAYED RELEASE ORAL at 05:11

## 2020-02-03 RX ADMIN — POLYETHYLENE GLYCOL 3350 17 GRAM(S): 17 POWDER, FOR SOLUTION ORAL at 17:13

## 2020-02-03 NOTE — PROGRESS NOTE ADULT - ASSESSMENT
Patient with apparent persistent fecal impaction.  On MiraLAX BID, Senna, lactulose and received GoLYTELY bowel regimen.  Planned for repeat KUB today.  Location of stool not amenable to manual disimpaction.  Consider ongoing mineral oil enemas or warm tap water enemas.  Will follow up.    Thank you for the consult.  Please do not hesitate to call with any questions or concerns.    SAEED Aldrich MD  [facility] [place] [facility] Physician [facility]  Division of Gastroenterology  Tel [phone]  Fax [phone]  02-03-20 @ 09:21

## 2020-02-03 NOTE — PROGRESS NOTE ADULT - SUBJECTIVE AND OBJECTIVE BOX
CC: Diarrhea (03 Feb 2020 09:17)    HPI:  64yr old male with Schizoaffective, Cerebral palsy, mental retardation, Anemia, Anxiety, h/o DVT in past and recent RLE DVT 1 week ago residing in a private home under foster care - [facility], was brought in by caregiver Lyn Amaral for many episodes of diarrhea.  In the ER, he was found to be tachycardic in high 110s -persistently after 2 lit of iv Fluids., Labs s/o mildly elevated transaminases. Ct abd pelvis s/o Rectosigmoid distention with possible impacted stool and stercoral colitis. ER attending Dr Crystal unable to disimpact. (30 Jan 2020 11:05)    INTERVAL HPI/OVERNIGHT EVENTS: Patient seen and examined lying in bed.  Patient with unclear speech and unable to answer ROS.  Appears comfortable.  No acute events overnight.    Vital Signs Last 24 Hrs  T(C): 36.5 (03 Feb 2020 08:07), Max: 36.8 (02 Feb 2020 17:04)  T(F): 97.7 (03 Feb 2020 08:07), Max: 98.3 (02 Feb 2020 17:04)  HR: 79 (03 Feb 2020 08:07) (79 - 112)  BP: 132/- (03 Feb 2020 08:07) (132/- - 159/92)  BP(mean): --  RR: 18 (03 Feb 2020 08:07) (18 - 18)  SpO2: 99% (03 Feb 2020 08:07) (94% - 99%)  I&O's Detail      PHYSICAL EXAM:  GENERAL: NAD  HEAD:  Atraumatic, Normocephalic  NECK: Supple, No JVD, Normal thyroid  NERVOUS SYSTEM:  Alert & Oriented X3, Good concentration; Motor Strength 5/5 B/L upper and lower extremities  CHEST/LUNG: Clear to auscultation bilaterally; No rales, rhonchi, wheezing, or rubs  HEART: Regular rate and rhythm; No murmurs, rubs, or gallops  ABDOMEN: Soft, Nontender, Nondistended; Bowel sounds present  EXTREMITIES:  2+ Peripheral Pulses, No clubbing, cyanosis, or edema                              15.3   4.02  )-----------( 152      ( 02 Feb 2020 07:35 )             45.9     02 Feb 2020 07:35    141    |  105    |  8.0    ----------------------------<  107    3.6     |  21.0   |  0.47     Ca    9.3        02 Feb 2020 07:35        MEDICATIONS  (STANDING):  ascorbic acid 500 milliGRAM(s) Oral daily  enoxaparin Injectable 60 milliGRAM(s) SubCutaneous every 12 hours  lactulose Syrup 30 Gram(s) Oral two times a day  melatonin 3 milliGRAM(s) Oral at bedtime  mineral oil enema 133 milliLiter(s) Rectal two times a day  OLANZapine 10 milliGRAM(s) Oral at bedtime  OLANZapine 2.5 milliGRAM(s) Oral daily  pantoprazole    Tablet 40 milliGRAM(s) Oral before breakfast  polyethylene glycol 3350 17 Gram(s) Oral two times a day  senna 2 Tablet(s) Oral at bedtime  sodium chloride 0.45% with potassium chloride 20 mEq/L 1000 milliLiter(s) (100 mL/Hr) IV Continuous <Continuous>  temazepam 15 milliGRAM(s) Oral at bedtime    MEDICATIONS  (PRN):  diphenhydrAMINE 25 milliGRAM(s) Oral at bedtime PRN Rash and/or Itching      RADIOLOGY & ADDITIONAL TESTS:  < from: Xray Kidney Ureter Bladder (02.03.20 @ 08:57) >   EXAM:  XR KUB 1 VIEW                          PROCEDURE DATE:  02/03/2020          INTERPRETATION:  XR ABDOMEN KUB    HISTORY:  Constipation    VIEWS: Supine    COMPARISON:  Abdominal x-ray 2/1/2020 and CT abdomen/pelvis 1/30/2020    Large amount of retained fecal material again seen in the rectum.    A distended colon is again seen in the pelvis, likely the sigmoid colon.    Chronic fracture deformity of the left hip again seen.    IMPRESSION:      Large amount of retained fecal material again seen in the rectum.                CAIT RUIZ   This document has been electronically signed. Feb  3 2020 11:50AM                < end of copied text >

## 2020-02-03 NOTE — PROGRESS NOTE ADULT - SUBJECTIVE AND OBJECTIVE BOX
Chief Complaint: This is a 64y old man patient being seen in follow-up consultation for fecal impaction.    HPI / 24H events:  Patient still passing liquid stool.  Unable to provide any history.    ROS: Not obtainable in this patient.    PAST MEDICAL/SURGICAL HISTORY:  Mental retardation  Schizoaffective disorder, bipolar type  Anemia, unspecified type  Spastic quadriplegic cerebral palsy  No significant past surgical history    MEDICATIONS  (STANDING):  ascorbic acid 500 milliGRAM(s) Oral daily  ciprofloxacin   IVPB 400 milliGRAM(s) IV Intermittent every 12 hours  enoxaparin Injectable 60 milliGRAM(s) SubCutaneous every 12 hours  lactulose Syrup 30 Gram(s) Oral two times a day  melatonin 3 milliGRAM(s) Oral at bedtime  metroNIDAZOLE  IVPB 500 milliGRAM(s) IV Intermittent every 8 hours  OLANZapine 10 milliGRAM(s) Oral at bedtime  OLANZapine 2.5 milliGRAM(s) Oral daily  pantoprazole    Tablet 40 milliGRAM(s) Oral before breakfast  polyethylene glycol 3350 17 Gram(s) Oral two times a day  senna 2 Tablet(s) Oral at bedtime  sodium chloride 0.45% with potassium chloride 20 mEq/L 1000 milliLiter(s) (100 mL/Hr) IV Continuous <Continuous>  temazepam 15 milliGRAM(s) Oral at bedtime    MEDICATIONS  (PRN):  diphenhydrAMINE 25 milliGRAM(s) Oral at bedtime PRN Rash and/or Itching    No Known Allergies    T(C): 36.7 (02-02-20 @ 23:39), Max: 36.8 (02-02-20 @ 17:04)  HR: 88 (02-02-20 @ 23:39) (88 - 112)  BP: 139/81 (02-02-20 @ 23:39) (139/81 - 159/92)  RR: 18 (02-02-20 @ 23:39) (18 - 18)  SpO2: 94% (02-02-20 @ 23:39) (94% - 96%)    I&O's Summary    PHYSICAL EXAM:  Constitutional: Chronically ill, rocking back-and-forth in bed; speech intelligible   Eyes: Sclerae anicteric, conjunctivae normal  ENMT: Mucus membranes moist, no oropharyngeal thrush noted  Neck: No thyroid nodules appreciated, no significant cervical or supraclavicular lymphadenopathy  Respiratory: Breathing nonlabored; clear to auscultation  Cardiovascular: Mildly tachycardic  Gastrointestinal: Soft, nontender, nondistended, normoactive bowel sounds; no hepatosplenomegaly appreciated; no rebound tenderness or involuntary guarding  Extremities: No clubbing, cyanosis or edema  Neurological: A&O x 0  Skin: No jaundice  Lymph Nodes: No significant lymphadenopathy  Musculoskeletal: Diffuse peripheral atrophy  Psychiatric: Nonassessable                      15.3   4.02  )-----------( 152      ( 02-02 @ 07:35 )             45.9                14.7   3.40  )-----------( 138      ( 02-01 @ 09:56 )             44.6       02-02    141  |  105  |  8.0  ----------------------------<  107<H>  3.6   |  21.0<L>  |  0.47<L>    Ca    9.3      02 Feb 2020 07:35

## 2020-02-03 NOTE — PROGRESS NOTE ADULT - ASSESSMENT
63yo male with fecal impaction of the rectosigmoid colon not accessible for disimpaction with fingers from below. Abdominal exam is benign and he is stable. Continue bowel regimen from above and below with enemas. patient with resolution of symptoms with current bowel regimen    - continue bowel regimen from above and below with TID enemas  - f/u am KUB 2/3

## 2020-02-03 NOTE — PROGRESS NOTE ADULT - ASSESSMENT
64yr old male with Schizoaffective, Cerebral palsy, mental retardation, Anemia, Anxiety, h/o DVT in past and recent RLE DVT 1 week ago residing in a private home under foster care - [facility] was brought in by caregiver Lyn Amaral for many episodes of diarrhea. Patient was diagnosed with RLE DVT 1 week ago when he was found to have RLE swelling - was taken to  - found to have Rt peroneal DVT. In the ER, he was found to be tachycardic in high 110s -persistently after 2 liters of iv Fluids, Labs s/o mildly elevated transaminases. Ct abd pelvis s/o Rectosigmoid distention with possible impacted stool and stercoral colitis.     1. Constipation /stercoral colitis   seen by GI and colorectal surgery   disimpaction unsuccessful .  on bowel regimen , laxatives , enema .  will monitor closely .  discontinued antibiotics    2. Hx of DVT twice   on lovenox.  once stable should be able to switch to NOAC .    3. Dehydration   improved.  monitor.    4. Cerebral palsy/schizoaffective disorder   around his baseline .  continue with home regimen .    5. DVT prophylaxis  on lovenox     dispo: unclear, once fecal impaction has been resolved

## 2020-02-03 NOTE — PROGRESS NOTE ADULT - SUBJECTIVE AND OBJECTIVE BOX
HPI/OVERNIGHT EVENTS: no acute events overnight. Patient AVSS. Abdominal pain improved. patient with multiple watery bowel movements yesterday. Patient continues to have bowel regimen from above and below. AVSS    MEDICATIONS  (STANDING):  ascorbic acid 500 milliGRAM(s) Oral daily  ciprofloxacin   IVPB 400 milliGRAM(s) IV Intermittent every 12 hours  enoxaparin Injectable 60 milliGRAM(s) SubCutaneous every 12 hours  lactulose Syrup 30 Gram(s) Oral two times a day  melatonin 3 milliGRAM(s) Oral at bedtime  metroNIDAZOLE  IVPB 500 milliGRAM(s) IV Intermittent every 8 hours  OLANZapine 10 milliGRAM(s) Oral at bedtime  OLANZapine 2.5 milliGRAM(s) Oral daily  pantoprazole    Tablet 40 milliGRAM(s) Oral before breakfast  polyethylene glycol 3350 17 Gram(s) Oral two times a day  senna 2 Tablet(s) Oral at bedtime  sodium chloride 0.45% with potassium chloride 20 mEq/L 1000 milliLiter(s) (100 mL/Hr) IV Continuous <Continuous>  temazepam 15 milliGRAM(s) Oral at bedtime    MEDICATIONS  (PRN):  diphenhydrAMINE 25 milliGRAM(s) Oral at bedtime PRN Rash and/or Itching      Vital Signs Last 24 Hrs  T(C): 36.7 (02 Feb 2020 23:39), Max: 36.8 (02 Feb 2020 17:04)  T(F): 98.1 (02 Feb 2020 23:39), Max: 98.3 (02 Feb 2020 17:04)  HR: 88 (02 Feb 2020 23:39) (88 - 112)  BP: 139/81 (02 Feb 2020 23:39) (139/81 - 159/92)  BP(mean): --  RR: 18 (02 Feb 2020 23:39) (18 - 18)  SpO2: 94% (02 Feb 2020 23:39) (94% - 98%)    Physical Exam:   GEN: patient resting comfortably in bed, in no acute distress  RESP: respirations are unlabored, no accessory muscle use, no conversational dyspnea  CVS: Regular rate  GI: Abdomen soft, non-tender, non distended, no rebound tenderness / guarding      I&O's Detail    01 Feb 2020 07:01  -  02 Feb 2020 07:00  --------------------------------------------------------  IN:    IV PiggyBack: 400 mL    Oral Fluid: 480 mL  Total IN: 880 mL    OUT:  Total OUT: 0 mL    Total NET: 880 mL          LABS:                        15.3   4.02  )-----------( 152      ( 02 Feb 2020 07:35 )             45.9     02-02    141  |  105  |  8.0  ----------------------------<  107<H>  3.6   |  21.0<L>  |  0.47<L>    Ca    9.3      02 Feb 2020 07:35 HPI/OVERNIGHT EVENTS: no acute events overnight. Patient AVSS. Abdominal pain improved. patient with multiple watery bowel movements yesterday. Patient continues to have bowel regimen from above and below. AVSS    MEDICATIONS  (STANDING):  ascorbic acid 500 milliGRAM(s) Oral daily  ciprofloxacin   IVPB 400 milliGRAM(s) IV Intermittent every 12 hours  enoxaparin Injectable 60 milliGRAM(s) SubCutaneous every 12 hours  lactulose Syrup 30 Gram(s) Oral two times a day  melatonin 3 milliGRAM(s) Oral at bedtime  metroNIDAZOLE  IVPB 500 milliGRAM(s) IV Intermittent every 8 hours  OLANZapine 10 milliGRAM(s) Oral at bedtime  OLANZapine 2.5 milliGRAM(s) Oral daily  pantoprazole    Tablet 40 milliGRAM(s) Oral before breakfast  polyethylene glycol 3350 17 Gram(s) Oral two times a day  senna 2 Tablet(s) Oral at bedtime  sodium chloride 0.45% with potassium chloride 20 mEq/L 1000 milliLiter(s) (100 mL/Hr) IV Continuous <Continuous>  temazepam 15 milliGRAM(s) Oral at bedtime    MEDICATIONS  (PRN):  diphenhydrAMINE 25 milliGRAM(s) Oral at bedtime PRN Rash and/or Itching      Vital Signs Last 24 Hrs  T(C): 36.7 (02 Feb 2020 23:39), Max: 36.8 (02 Feb 2020 17:04)  T(F): 98.1 (02 Feb 2020 23:39), Max: 98.3 (02 Feb 2020 17:04)  HR: 88 (02 Feb 2020 23:39) (88 - 112)  BP: 139/81 (02 Feb 2020 23:39) (139/81 - 159/92)  RR: 18 (02 Feb 2020 23:39) (18 - 18)  SpO2: 94% (02 Feb 2020 23:39) (94% - 98%)    Physical Exam:   GEN: patient resting comfortably in bed, in no acute distress  RESP: respirations are unlabored, no accessory muscle use, no conversational dyspnea  CVS: Regular rate  GI: Abdomen soft, non-tender, non distended, no rebound tenderness / guarding      I&O's Detail    01 Feb 2020 07:01  -  02 Feb 2020 07:00  --------------------------------------------------------  IN:    IV PiggyBack: 400 mL    Oral Fluid: 480 mL  Total IN: 880 mL    OUT:  Total OUT: 0 mL    Total NET: 880 mL          LABS:                        15.3   4.02  )-----------( 152      ( 02 Feb 2020 07:35 )             45.9     02-02    141  |  105  |  8.0  ----------------------------<  107<H>  3.6   |  21.0<L>  |  0.47<L>    Ca    9.3      02 Feb 2020 07:35

## 2020-02-04 PROCEDURE — 99232 SBSQ HOSP IP/OBS MODERATE 35: CPT

## 2020-02-04 PROCEDURE — 99233 SBSQ HOSP IP/OBS HIGH 50: CPT

## 2020-02-04 PROCEDURE — 74018 RADEX ABDOMEN 1 VIEW: CPT | Mod: 26

## 2020-02-04 RX ORDER — LACTULOSE 10 G/15ML
30 SOLUTION ORAL THREE TIMES A DAY
Refills: 0 | Status: DISCONTINUED | OUTPATIENT
Start: 2020-02-04 | End: 2020-02-06

## 2020-02-04 RX ADMIN — ENOXAPARIN SODIUM 60 MILLIGRAM(S): 100 INJECTION SUBCUTANEOUS at 05:20

## 2020-02-04 RX ADMIN — LACTULOSE 30 GRAM(S): 10 SOLUTION ORAL at 13:06

## 2020-02-04 RX ADMIN — OLANZAPINE 10 MILLIGRAM(S): 15 TABLET, FILM COATED ORAL at 22:20

## 2020-02-04 RX ADMIN — Medication 133 MILLILITER(S): at 05:33

## 2020-02-04 RX ADMIN — SENNA PLUS 2 TABLET(S): 8.6 TABLET ORAL at 22:24

## 2020-02-04 RX ADMIN — LACTULOSE 30 GRAM(S): 10 SOLUTION ORAL at 22:27

## 2020-02-04 RX ADMIN — ENOXAPARIN SODIUM 60 MILLIGRAM(S): 100 INJECTION SUBCUTANEOUS at 18:16

## 2020-02-04 RX ADMIN — PANTOPRAZOLE SODIUM 40 MILLIGRAM(S): 20 TABLET, DELAYED RELEASE ORAL at 05:21

## 2020-02-04 RX ADMIN — Medication 3 MILLIGRAM(S): at 22:30

## 2020-02-04 RX ADMIN — TEMAZEPAM 15 MILLIGRAM(S): 15 CAPSULE ORAL at 22:24

## 2020-02-04 RX ADMIN — Medication 133 MILLILITER(S): at 18:16

## 2020-02-04 RX ADMIN — Medication 500 MILLIGRAM(S): at 11:23

## 2020-02-04 RX ADMIN — LACTULOSE 30 GRAM(S): 10 SOLUTION ORAL at 05:20

## 2020-02-04 RX ADMIN — Medication 10 MILLIGRAM(S): at 11:23

## 2020-02-04 RX ADMIN — OLANZAPINE 2.5 MILLIGRAM(S): 15 TABLET, FILM COATED ORAL at 11:23

## 2020-02-04 RX ADMIN — POLYETHYLENE GLYCOL 3350 17 GRAM(S): 17 POWDER, FOR SOLUTION ORAL at 18:16

## 2020-02-04 RX ADMIN — POLYETHYLENE GLYCOL 3350 17 GRAM(S): 17 POWDER, FOR SOLUTION ORAL at 05:21

## 2020-02-04 NOTE — PROGRESS NOTE ADULT - ASSESSMENT
64yr old male with Schizoaffective, Cerebral palsy, mental retardation, Anemia, Anxiety, h/o DVT in past and recent RLE DVT 1 week prior residing in a private home under foster care - [facility] was brought in by caregiver Lyn Amaral for many episodes of diarrhea. Patient was diagnosed with RLE DVT 1 week ago when he was found to have RLE swelling - was taken to  - found to have Rt peroneal DVT. In the ER, he was found to be tachycardic in high 110s -persistently after 2 liters of iv Fluids, Labs s/o mildly elevated transaminases. Ct abd pelvis s/o Rectosigmoid distention with possible impacted stool and stercoral colitis.     1. Constipation /stercoral colitis   seen by GI and colorectal surgery   disimpaction unsuccessful .  on bowel regimen , laxatives , enema .  will monitor closely .  discontinued antibiotics  Will recheck KUB in am.    2. Hx of DVT twice   on lovenox.  once stable should be able to switch to NOAC .    3. Dehydration   improved.  monitor.    4. Cerebral palsy/schizoaffective disorder   around his baseline .  continue with home regimen .    5. DVT prophylaxis  on lovenox     dispo: unclear, once fecal impaction has been resolved

## 2020-02-04 NOTE — PROGRESS NOTE ADULT - SUBJECTIVE AND OBJECTIVE BOX
CC: Diarrhea (04 Feb 2020 08:18)    HPI:  64yr old male with Schizoaffective, Cerebral palsy, mental retardation, Anemia, Anxiety, h/o DVT in past and recent RLE DVT 1 week ago residing in a private home under foster care - [facility] was brought in by caregiver Lyn Amaral for many episodes of diarrhea.    INTERVAL HPI/OVERNIGHT EVENTS: Patient seen and examined lying in bed.  Patient with unclear speech and unable to answer ROS.  Per RN, patient had 1 liquid BM today, none last night.  Patient appears comfortable.  No acute events overnight.      Vital Signs Last 24 Hrs  T(C): 36.9 (04 Feb 2020 07:32), Max: 36.9 (03 Feb 2020 23:30)  T(F): 98.4 (04 Feb 2020 07:32), Max: 98.5 (03 Feb 2020 23:30)  HR: 91 (04 Feb 2020 07:32) (78 - 91)  BP: 132/91 (04 Feb 2020 07:32) (105/70 - 155/91)  BP(mean): --  RR: 18 (04 Feb 2020 07:32) (18 - 18)  SpO2: 98% (04 Feb 2020 07:32) (98% - 98%)  I&O's Detail      PHYSICAL EXAM:  GENERAL: NAD  HEAD:  Atraumatic, Normocephalic  NECK: Supple, No JVD, Normal thyroid  NERVOUS SYSTEM:  Alert, unclear speech, generalized weakness  CHEST/LUNG: Clear to auscultation bilaterally; No rales, rhonchi, wheezing, or rubs  HEART: Regular rate and rhythm; No murmurs, rubs, or gallops  ABDOMEN: Soft, Nontender, Nondistended; Bowel sounds present  EXTREMITIES:  2+ Peripheral Pulses, No clubbing, cyanosis, or edema        MEDICATIONS  (STANDING):  ascorbic acid 500 milliGRAM(s) Oral daily  bisacodyl Suppository 10 milliGRAM(s) Rectal daily  enoxaparin Injectable 60 milliGRAM(s) SubCutaneous every 12 hours  lactulose Syrup 30 Gram(s) Oral three times a day  melatonin 3 milliGRAM(s) Oral at bedtime  mineral oil enema 133 milliLiter(s) Rectal two times a day  OLANZapine 10 milliGRAM(s) Oral at bedtime  OLANZapine 2.5 milliGRAM(s) Oral daily  pantoprazole    Tablet 40 milliGRAM(s) Oral before breakfast  polyethylene glycol 3350 17 Gram(s) Oral two times a day  senna 2 Tablet(s) Oral at bedtime  sodium chloride 0.45% with potassium chloride 20 mEq/L 1000 milliLiter(s) (100 mL/Hr) IV Continuous <Continuous>  temazepam 15 milliGRAM(s) Oral at bedtime    MEDICATIONS  (PRN):  diphenhydrAMINE 25 milliGRAM(s) Oral at bedtime PRN Rash and/or Itching

## 2020-02-04 NOTE — PROGRESS NOTE ADULT - SUBJECTIVE AND OBJECTIVE BOX
Chief Complaint: This is a 64y old man patient being seen in follow-up consultation for fecal impaction.    HPI / 24H events:  No reported bowel movements overnight.  Yesterday's KUB with persistent fecal impaction.  Patient can offer no history but appears comfortable.    ROS: Unobtainable in this patient    PAST MEDICAL/SURGICAL HISTORY:  Mental retardation  Schizoaffective disorder, bipolar type  Anemia, unspecified type  Spastic quadriplegic cerebral palsy  No significant past surgical history    MEDICATIONS  (STANDING):  ascorbic acid 500 milliGRAM(s) Oral daily  enoxaparin Injectable 60 milliGRAM(s) SubCutaneous every 12 hours  lactulose Syrup 30 Gram(s) Oral three times a day  melatonin 3 milliGRAM(s) Oral at bedtime  mineral oil enema 133 milliLiter(s) Rectal two times a day  OLANZapine 10 milliGRAM(s) Oral at bedtime  OLANZapine 2.5 milliGRAM(s) Oral daily  pantoprazole    Tablet 40 milliGRAM(s) Oral before breakfast  polyethylene glycol 3350 17 Gram(s) Oral two times a day  senna 2 Tablet(s) Oral at bedtime  sodium chloride 0.45% with potassium chloride 20 mEq/L 1000 milliLiter(s) (100 mL/Hr) IV Continuous <Continuous>  temazepam 15 milliGRAM(s) Oral at bedtime    MEDICATIONS  (PRN):  diphenhydrAMINE 25 milliGRAM(s) Oral at bedtime PRN Rash and/or Itching    No Known Allergies    T(C): 36.9 (02-04-20 @ 07:32), Max: 36.9 (02-03-20 @ 23:30)  HR: 91 (02-04-20 @ 07:32) (78 - 91)  BP: 132/91 (02-04-20 @ 07:32) (105/70 - 155/91)  RR: 18 (02-04-20 @ 07:32) (18 - 18)  SpO2: 98% (02-04-20 @ 07:32) (98% - 98%)    I&O's Summary    PHYSICAL EXAM:  Constitutional: Chronically ill, rocking back-and-forth in bed; speech intelligible   Eyes: Sclerae anicteric, conjunctivae normal  ENMT: Mucus membranes moist, no oropharyngeal thrush noted  Neck: No thyroid nodules appreciated, no significant cervical or supraclavicular lymphadenopathy  Respiratory: Breathing nonlabored; clear to auscultation  Cardiovascular: Mildly tachycardic  Gastrointestinal: Soft, nontender, nondistended, normoactive bowel sounds; no hepatosplenomegaly appreciated; no rebound tenderness or involuntary guarding  Extremities: No clubbing, cyanosis or edema  Neurological: A&O x 0  Skin: No jaundice  Lymph Nodes: No significant lymphadenopathy  Musculoskeletal: Diffuse peripheral atrophy  Psychiatric: Nonassessable                    15.3   4.02  )-----------( 152      ( 02-02 @ 07:35 )             45.9                14.7   3.40  )-----------( 138      ( 02-01 @ 09:56 )             44.6   IMAGING: I personally reviewed the KUB, and I agree with the radiologist's interpretation as described below:    < from: Xray Kidney Ureter Bladder (02.03.20 @ 08:57) >  Large amount of retained fecal material again seen in the rectum.    A distended colon is again seen in the pelvis, likely the sigmoid colon.    Chronic fracture deformity of the left hip again seen.    IMPRESSION:      Large amount of retained fecal material again seen in the rectum.    < end of copied text >

## 2020-02-05 PROCEDURE — 99233 SBSQ HOSP IP/OBS HIGH 50: CPT

## 2020-02-05 PROCEDURE — 99232 SBSQ HOSP IP/OBS MODERATE 35: CPT

## 2020-02-05 RX ORDER — SOD SULF/SODIUM/NAHCO3/KCL/PEG
4000 SOLUTION, RECONSTITUTED, ORAL ORAL ONCE
Refills: 0 | Status: COMPLETED | OUTPATIENT
Start: 2020-02-05 | End: 2020-02-05

## 2020-02-05 RX ADMIN — LACTULOSE 30 GRAM(S): 10 SOLUTION ORAL at 14:22

## 2020-02-05 RX ADMIN — LACTULOSE 20 GRAM(S): 10 SOLUTION ORAL at 22:27

## 2020-02-05 RX ADMIN — Medication 10 MILLIGRAM(S): at 11:38

## 2020-02-05 RX ADMIN — TEMAZEPAM 15 MILLIGRAM(S): 15 CAPSULE ORAL at 22:26

## 2020-02-05 RX ADMIN — OLANZAPINE 2.5 MILLIGRAM(S): 15 TABLET, FILM COATED ORAL at 11:35

## 2020-02-05 RX ADMIN — Medication 133 MILLILITER(S): at 05:00

## 2020-02-05 RX ADMIN — Medication 133 MILLILITER(S): at 17:04

## 2020-02-05 RX ADMIN — SENNA PLUS 2 TABLET(S): 8.6 TABLET ORAL at 22:28

## 2020-02-05 RX ADMIN — POLYETHYLENE GLYCOL 3350 17 GRAM(S): 17 POWDER, FOR SOLUTION ORAL at 17:04

## 2020-02-05 RX ADMIN — ENOXAPARIN SODIUM 60 MILLIGRAM(S): 100 INJECTION SUBCUTANEOUS at 17:04

## 2020-02-05 RX ADMIN — LACTULOSE 30 GRAM(S): 10 SOLUTION ORAL at 05:00

## 2020-02-05 RX ADMIN — Medication 3 MILLIGRAM(S): at 22:28

## 2020-02-05 RX ADMIN — Medication 500 MILLIGRAM(S): at 11:35

## 2020-02-05 RX ADMIN — POLYETHYLENE GLYCOL 3350 17 GRAM(S): 17 POWDER, FOR SOLUTION ORAL at 05:00

## 2020-02-05 RX ADMIN — PANTOPRAZOLE SODIUM 40 MILLIGRAM(S): 20 TABLET, DELAYED RELEASE ORAL at 05:00

## 2020-02-05 RX ADMIN — ENOXAPARIN SODIUM 60 MILLIGRAM(S): 100 INJECTION SUBCUTANEOUS at 05:00

## 2020-02-05 RX ADMIN — OLANZAPINE 10 MILLIGRAM(S): 15 TABLET, FILM COATED ORAL at 22:28

## 2020-02-05 RX ADMIN — Medication 4000 MILLILITER(S): at 12:53

## 2020-02-05 NOTE — PROGRESS NOTE ADULT - ASSESSMENT
64yr old male with Schizoaffective, Cerebral palsy, mental retardation, Anemia, Anxiety, h/o DVT in past and recent RLE DVT 1 week prior residing in a private home under foster care - OPWDD was brought in by caregiver Lyn Amaral for many episodes of diarrhea. Patient was diagnosed with RLE DVT 1 week ago when he was found to have RLE swelling - was taken to  - found to have Rt peroneal DVT. In the ER, he was found to be tachycardic in high 110s -persistently after 2 liters of iv Fluids, Labs s/o mildly elevated transaminases. Ct abd pelvis s/o Rectosigmoid distention with possible impacted stool and stercoral colitis.     1. Constipation /stercoral colitis   seen by GI and colorectal surgery   disimpaction unsuccessful .  on bowel regimen , laxatives , enema . GoLytely today  will monitor closely .  discontinued antibiotics  KUB in am.    2. Hx of DVT twice   on lovenox.  once stable should be able to switch to NOAC .    3. Dehydration   improved.  monitor.    4. Cerebral palsy/schizoaffective disorder   around his baseline .  continue with home regimen .    5. DVT prophylaxis  on lovenox     dispo: unclear, once fecal impaction has been resolved

## 2020-02-05 NOTE — PROGRESS NOTE ADULT - PROBLEM SELECTOR PLAN 1
-  Will give another 4 liters of golytely   continue miralax bid, lactulose tid, dulcolax, senna  - xay unchanged yesterday, rectal today- no stool in vault

## 2020-02-05 NOTE — PROGRESS NOTE ADULT - SUBJECTIVE AND OBJECTIVE BOX
Patient is a 64y old  Male who presents with a chief complaint of Diarrhea (04 Feb 2020 13:37)      HPI:  64yr old male with Schizoaffective, Cerebral palsy, mental retardation, Anemia, Anxiety, h/o DVT. Patient is awake and altert.  No oriented. Not able to give hx.  Eats well as per nursing. Few liquid stools yesterday          REVIEW OF SYSTEMS:  unable to obtain    PAST MEDICAL & SURGICAL HISTORY:  Mental retardation  Schizoaffective disorder, bipolar type  Anemia, unspecified type  Spastic quadriplegic cerebral palsy  No significant past surgical history      FAMILY HISTORY:  No pertinent family history in first degree relatives      SOCIAL HISTORY:  Smoking Status: [ ] Current, [ ] Former, [ ] Never  Pack Years:  [  ] EtOH-no  [  ] IVDA    MEDICATIONS:  MEDICATIONS  (STANDING):  ascorbic acid 500 milliGRAM(s) Oral daily  bisacodyl Suppository 10 milliGRAM(s) Rectal daily  enoxaparin Injectable 60 milliGRAM(s) SubCutaneous every 12 hours  lactulose Syrup 30 Gram(s) Oral three times a day  melatonin 3 milliGRAM(s) Oral at bedtime  mineral oil enema 133 milliLiter(s) Rectal two times a day  OLANZapine 10 milliGRAM(s) Oral at bedtime  OLANZapine 2.5 milliGRAM(s) Oral daily  pantoprazole    Tablet 40 milliGRAM(s) Oral before breakfast  polyethylene glycol 3350 17 Gram(s) Oral two times a day  polyethylene glycol/electrolyte Solution. 4000 milliLiter(s) Oral once  senna 2 Tablet(s) Oral at bedtime  sodium chloride 0.45% with potassium chloride 20 mEq/L 1000 milliLiter(s) (100 mL/Hr) IV Continuous <Continuous>  temazepam 15 milliGRAM(s) Oral at bedtime    MEDICATIONS  (PRN):  diphenhydrAMINE 25 milliGRAM(s) Oral at bedtime PRN Rash and/or Itching      Allergies    No Known Allergies    Intolerances        Vital Signs Last 24 Hrs  T(C): 36.9 (05 Feb 2020 00:26), Max: 36.9 (05 Feb 2020 00:26)  T(F): 98.5 (05 Feb 2020 00:26), Max: 98.5 (05 Feb 2020 00:26)  HR: 87 (05 Feb 2020 00:26) (87 - 109)  BP: 109/67 (05 Feb 2020 00:26) (109/67 - 141/85)  BP(mean): --  RR: 18 (05 Feb 2020 00:26) (18 - 18)  SpO2: 96% (05 Feb 2020 00:26) (96% - 97%)    02-04 @ 07:01  -  02-05 @ 07:00  --------------------------------------------------------  IN: 480 mL / OUT: 0 mL / NET: 480 mL          PHYSICAL EXAM:    General: Well developed; well nourished; in no acute distress  HEENT: MMM, conjunctiva and sclera clear  H- RRR  L- CTA  Gastrointestinal: Soft, non-tender non-distended; Normal bowel sounds; No rebound or guarding  Extremities: Normal range of motion, No clubbing, cyanosis or edema  Neurological: Alert but  oriented x O  Skin: Warm and dry. No obvious rash  rectal- no stool in rectal vault today      LABS:                    RADIOLOGY & ADDITIONAL STUDIES:     < from: Xray Kidney Ureter Bladder (02.04.20 @ 15:27) >  INTERPRETATION:  Abdomen one view    HISTORY: Fecal impaction    Comparison: 2/3/2020    Frontal view of the abdomen shows a similar gas pattern. Volume of rectal fecal residue is grossly similar. No definite free air is identified. Left hip fracture is again noted.    IMPRESSION:  No interval change.       Thank you for this referral.      < end of copied text >

## 2020-02-05 NOTE — PROGRESS NOTE ADULT - SUBJECTIVE AND OBJECTIVE BOX
CC: Diarrhea (05 Feb 2020 07:49)    HPI:  64yr old male with Schizoaffective, Cerebral palsy, mental retardation, Anemia, Anxiety, h/o DVT in past and recent RLE DVT 1 week ago residing in a private home under foster care - OPWDD was brought in by caregiver Lyn Amaral for many episodes of diarrhea.    INTERVAL HPI/OVERNIGHT EVENTS: Patient seen and examined lying in bed.  Per RN, patient had BM last night.  Started on GoLytely.  Patient unable to answer ROS as patient has unclear speech.    Vital Signs Last 24 Hrs  T(C): 36.6 (05 Feb 2020 08:32), Max: 36.9 (05 Feb 2020 00:26)  T(F): 97.9 (05 Feb 2020 08:32), Max: 98.5 (05 Feb 2020 00:26)  HR: 93 (05 Feb 2020 08:32) (87 - 109)  BP: 150/95 (05 Feb 2020 08:32) (109/67 - 150/95)  BP(mean): --  RR: 18 (05 Feb 2020 08:32) (18 - 18)  SpO2: 97% (05 Feb 2020 08:32) (96% - 97%)  I&O's Detail    04 Feb 2020 07:01  -  05 Feb 2020 07:00  --------------------------------------------------------  IN:    Oral Fluid: 480 mL  Total IN: 480 mL    OUT:  Total OUT: 0 mL    Total NET: 480 mL    PHYSICAL EXAM:  GENERAL: NAD  HEAD:  Atraumatic, Normocephalic  NECK: Supple, No JVD, Normal thyroid  NERVOUS SYSTEM:  Alert, Good concentration; generalized weakness  CHEST/LUNG: Clear to auscultation bilaterally; No rales, rhonchi, wheezing, or rubs  HEART: Regular rate and rhythm; No murmurs, rubs, or gallops  ABDOMEN: Soft, Nontender, Nondistended; Bowel sounds present  EXTREMITIES:  2+ Peripheral Pulses, No clubbing, cyanosis, or edema        MEDICATIONS  (STANDING):  ascorbic acid 500 milliGRAM(s) Oral daily  bisacodyl Suppository 10 milliGRAM(s) Rectal daily  enoxaparin Injectable 60 milliGRAM(s) SubCutaneous every 12 hours  lactulose Syrup 30 Gram(s) Oral three times a day  melatonin 3 milliGRAM(s) Oral at bedtime  mineral oil enema 133 milliLiter(s) Rectal two times a day  OLANZapine 10 milliGRAM(s) Oral at bedtime  OLANZapine 2.5 milliGRAM(s) Oral daily  pantoprazole    Tablet 40 milliGRAM(s) Oral before breakfast  polyethylene glycol 3350 17 Gram(s) Oral two times a day  senna 2 Tablet(s) Oral at bedtime  sodium chloride 0.45% with potassium chloride 20 mEq/L 1000 milliLiter(s) (100 mL/Hr) IV Continuous <Continuous>  temazepam 15 milliGRAM(s) Oral at bedtime    MEDICATIONS  (PRN):  diphenhydrAMINE 25 milliGRAM(s) Oral at bedtime PRN Rash and/or Itching      RADIOLOGY & ADDITIONAL TESTS:  < from: Xray Kidney Ureter Bladder (02.04.20 @ 15:27) >   EXAM:  XR KUB 1 VIEW                          PROCEDURE DATE:  02/04/2020          INTERPRETATION:  Abdomen one view    HISTORY: Fecal impaction    Comparison: 2/3/2020    Frontal view of the abdomen shows a similar gas pattern. Volume of rectal fecal residue is grossly similar. No definite free air is identified. Left hip fracture is again noted.    IMPRESSION:  No interval change.       Thank you for this referral.                AMARILYS HARDEN M.D., ATTENDING RADIOLOGIST  This document has been electronically signed. Feb 4 2020  3:30PM                < end of copied text >

## 2020-02-06 ENCOUNTER — TRANSCRIPTION ENCOUNTER (OUTPATIENT)
Age: 65
End: 2020-02-06

## 2020-02-06 PROCEDURE — 74018 RADEX ABDOMEN 1 VIEW: CPT | Mod: 26

## 2020-02-06 PROCEDURE — 99233 SBSQ HOSP IP/OBS HIGH 50: CPT

## 2020-02-06 PROCEDURE — 99232 SBSQ HOSP IP/OBS MODERATE 35: CPT

## 2020-02-06 RX ORDER — FERROUS SULFATE 325(65) MG
0 TABLET ORAL
Qty: 0 | Refills: 0 | DISCHARGE

## 2020-02-06 RX ORDER — POLYETHYLENE GLYCOL 3350 17 G/17G
17 POWDER, FOR SOLUTION ORAL
Qty: 0 | Refills: 0 | DISCHARGE
Start: 2020-02-06

## 2020-02-06 RX ORDER — LACTULOSE 10 G/15ML
30 SOLUTION ORAL
Qty: 0 | Refills: 0 | DISCHARGE
Start: 2020-02-06

## 2020-02-06 RX ORDER — LACTULOSE 10 G/15ML
20 SOLUTION ORAL THREE TIMES A DAY
Refills: 0 | Status: DISCONTINUED | OUTPATIENT
Start: 2020-02-06 | End: 2020-02-08

## 2020-02-06 RX ORDER — POLYETHYLENE GLYCOL 3350 17 G/17G
0 POWDER, FOR SOLUTION ORAL
Qty: 0 | Refills: 0 | DISCHARGE

## 2020-02-06 RX ADMIN — Medication 133 MILLILITER(S): at 06:09

## 2020-02-06 RX ADMIN — POLYETHYLENE GLYCOL 3350 17 GRAM(S): 17 POWDER, FOR SOLUTION ORAL at 17:14

## 2020-02-06 RX ADMIN — ENOXAPARIN SODIUM 60 MILLIGRAM(S): 100 INJECTION SUBCUTANEOUS at 17:14

## 2020-02-06 RX ADMIN — PANTOPRAZOLE SODIUM 40 MILLIGRAM(S): 20 TABLET, DELAYED RELEASE ORAL at 06:09

## 2020-02-06 RX ADMIN — LACTULOSE 20 GRAM(S): 10 SOLUTION ORAL at 22:11

## 2020-02-06 RX ADMIN — POLYETHYLENE GLYCOL 3350 17 GRAM(S): 17 POWDER, FOR SOLUTION ORAL at 06:09

## 2020-02-06 RX ADMIN — LACTULOSE 20 GRAM(S): 10 SOLUTION ORAL at 12:54

## 2020-02-06 RX ADMIN — LACTULOSE 30 GRAM(S): 10 SOLUTION ORAL at 06:09

## 2020-02-06 RX ADMIN — TEMAZEPAM 15 MILLIGRAM(S): 15 CAPSULE ORAL at 22:11

## 2020-02-06 RX ADMIN — OLANZAPINE 2.5 MILLIGRAM(S): 15 TABLET, FILM COATED ORAL at 12:48

## 2020-02-06 RX ADMIN — Medication 3 MILLIGRAM(S): at 22:11

## 2020-02-06 RX ADMIN — SENNA PLUS 2 TABLET(S): 8.6 TABLET ORAL at 22:11

## 2020-02-06 RX ADMIN — Medication 10 MILLIGRAM(S): at 12:44

## 2020-02-06 RX ADMIN — Medication 500 MILLIGRAM(S): at 12:55

## 2020-02-06 RX ADMIN — OLANZAPINE 10 MILLIGRAM(S): 15 TABLET, FILM COATED ORAL at 22:11

## 2020-02-06 RX ADMIN — ENOXAPARIN SODIUM 60 MILLIGRAM(S): 100 INJECTION SUBCUTANEOUS at 06:08

## 2020-02-06 NOTE — DISCHARGE NOTE PROVIDER - NSDCFUADDINST_GEN_ALL_CORE_FT
please call your doctor for appointment. bring all meds and discharge papers to all health care visits. Return to ER if symptoms recur or any new concerning symptoms. fall precaution. watch for blood in stool or black stool as Lovenox can cause bleed in stomach and brain. call 911 if bleeding.

## 2020-02-06 NOTE — DISCHARGE NOTE PROVIDER - NSDCQMCOGNITION_NEU_ALL_CORE
Difficulty concentrating/Difficulty making decisions Difficulty remembering/Difficulty making decisions/Difficulty concentrating

## 2020-02-06 NOTE — DISCHARGE NOTE PROVIDER - NSDCFUADDAPPT_GEN_ALL_CORE_FT
Jewel Bagley Patient Status:  Hospital Outpatient Surgery    1944 MRN [id number]   [facility] ENDOSCOPY Attending Jie Yang MD   Hosp Day # 0 PCP Brian Hooker MD     PREOPERATIVE DIAGNOSIS/INDICATION: Dysphagia  POSTOPERT - Follow biopsies.  - Continue PPI's  - Repeat EDG dilatation in 3-4 weeks.     Gabe Mi  1/23/2018  11:45 AM Follow up with primary doctor.

## 2020-02-06 NOTE — PROGRESS NOTE ADULT - PROBLEM SELECTOR PLAN 1
He is now responding nicely to laxation. Would maintain on Lactulose 20 grams PO TID and Miralax BID. No plans or need for continued GI f/u. Signing off. Reconsult as needed. Thank you. He is now responding nicely to laxation. Would maintain on Lactulose 20 grams PO TID and Miralax BID and Senna two tablets QHS. He can eat normally as tolerated. No plans or need for continued GI f/u. Signing off. Reconsult as needed. Thank you.

## 2020-02-06 NOTE — DISCHARGE NOTE PROVIDER - PROVIDER TOKENS
FREE:[LAST:[PMD],PHONE:[(   )    -],FAX:[(   )    -]] FREE:[LAST:[[facility] MD],PHONE:[(   )    -],FAX:[(   )    -],FOLLOWUP:[1 week]]

## 2020-02-06 NOTE — DISCHARGE NOTE PROVIDER - HOSPITAL COURSE
64yr old male with Schizoaffective, Cerebral palsy, mental retardation, Anemia, Anxiety, h/o DVT in past and recent RLE DVT 1 week prior residing in a private home under foster care - OPWDD was brought in by caregiver Lyn Amaral for many episodes of diarrhea. Patient was diagnosed with RLE DVT 1 week ago when he was found to have RLE swelling - was taken to  - found to have Rt peroneal DVT. In the ER, he was found to be tachycardic in high 110s -persistently after 2 liters of iv Fluids, Labs s/o mildly elevated transaminases. Ct abd pelvis s/o Rectosigmoid distention with possible impacted stool and stercoral colitis. GI and CRS consulted.  Disimpaction unsuccessful.  Laxatives including enemas and Go Lytely given.  Patient improved.  Serial KUBs obtained. 64yr old male with Schizoaffective, Cerebral palsy, mental retardation, Anemia, Anxiety, h/o DVT in past and recent RLE DVT 1 week prior residing in a private home under foster care - OPWDD was brought in by caregiver Lyn Amaral for many episodes of diarrhea. Patient was diagnosed with RLE DVT 1 week ago when he was found to have RLE swelling - was taken to  - found to have Rt peroneal DVT. In the ER, he was found to be tachycardic in high 110s -persistently after 2 liters of iv Fluids, Labs s/o mildly elevated transaminases. Ct abd pelvis s/o Rectosigmoid distention with possible impacted stool and stercoral colitis. GI and CRS consulted.  Disimpaction unsuccessful.  Laxatives including enemas and Go Lytely given.  Patient improved.  Serial KUBs obtained.  GI and colorectal surgery was on board.         seen and examined at bedside. nothing acute. stable for DC        Vital Signs Last 24 Hrs    T(C): 36.7 (07 Feb 2020 07:21), Max: 36.7 (06 Feb 2020 22:09)    T(F): 98.1 (07 Feb 2020 07:21), Max: 98.1 (07 Feb 2020 07:21)    HR: 69 (07 Feb 2020 07:21) (69 - 134)    BP: 150/93 (07 Feb 2020 07:21) (134/84 - 150/93)    BP(mean): --    RR: 18 (07 Feb 2020 07:21) (18 - 18)    SpO2: 95% (06 Feb 2020 22:09) (95% - 98%)        Time spent 35 mn 64yr old male with Schizoaffective, Cerebral palsy, mental retardation, Anemia, Anxiety, h/o DVT in past and recent RLE DVT 1 week prior residing in a private home under foster care - OPWDD was brought in by caregiver Lyn Amaral for many episodes of diarrhea. Patient was diagnosed with RLE DVT 1 week ago when he was found to have RLE swelling - was taken to  - found to have Rt peroneal DVT. In the ER, he was found to be tachycardic in high 110s -persistently after 2 liters of iv Fluids, Labs s/o mildly elevated transaminases. Ct abd pelvis s/o Rectosigmoid distention with possible impacted stool and stercoral colitis. GI and CRS consulted.  Disimpaction unsuccessful.  Laxatives including enemas and Go Lytely given.  Patient improved.  Serial KUBs obtained.  GI and colorectal surgery was on board.         seen and examined at bedside. nothing acute. stable for DC        Vital Signs Last 24 Hrs    T(C): 36.7 (07 Feb 2020 07:21), Max: 36.7 (06 Feb 2020 22:09)    T(F): 98.1 (07 Feb 2020 07:21), Max: 98.1 (07 Feb 2020 07:21)    HR: 69 (07 Feb 2020 07:21) (69 - 134)    BP: 150/93 (07 Feb 2020 07:21) (134/84 - 150/93)    BP(mean): --    RR: 18 (07 Feb 2020 07:21) (18 - 18)    SpO2: 95% (06 Feb 2020 22:09) (95% - 98%)        Time spent 35 min 64yr old male with Schizoaffective, Cerebral palsy, mental retardation, Anemia, Anxiety, h/o DVT in past and recent RLE DVT 1 week prior residing in a private home under foster care - OPWDD was brought in by caregiver Lyn Amaral for many episodes of diarrhea. Patient was diagnosed with RLE DVT 1 week ago when he was found to have RLE swelling - was taken to  - found to have Rt peroneal DVT. In the ER, he was found to be tachycardic in high 110s -persistently after 2 liters of iv Fluids, Labs s/o mildly elevated transaminases. Ct abd pelvis s/o Rectosigmoid distention with possible impacted stool and stercoral colitis. GI and CRS consulted.  Disimpaction unsuccessful.  Laxatives including enemas and Go Lytely given.  Patient improved.  Serial KUBs obtained.  GI and colorectal surgery was on board.         Attending Attestation:    I personally saw and evaluated the patient and agree with the above assessment and plan    Patient stable for discharge     discharge time 34 minutes

## 2020-02-06 NOTE — PROGRESS NOTE ADULT - SUBJECTIVE AND OBJECTIVE BOX
Pt seen and examined. He had an extremely large BM this AM followed by liquid stool after a second dose of Golytely last night. Pt in NAD when seen this AM.     REVIEW OF SYSTEMS:  Unobtainable in this pt.      MEDICATIONS:  MEDICATIONS  (STANDING):  ascorbic acid 500 milliGRAM(s) Oral daily  bisacodyl Suppository 10 milliGRAM(s) Rectal daily  enoxaparin Injectable 60 milliGRAM(s) SubCutaneous every 12 hours  lactulose Syrup 30 Gram(s) Oral three times a day  melatonin 3 milliGRAM(s) Oral at bedtime  OLANZapine 10 milliGRAM(s) Oral at bedtime  OLANZapine 2.5 milliGRAM(s) Oral daily  pantoprazole    Tablet 40 milliGRAM(s) Oral before breakfast  polyethylene glycol 3350 17 Gram(s) Oral two times a day  senna 2 Tablet(s) Oral at bedtime  sodium chloride 0.45% with potassium chloride 20 mEq/L 1000 milliLiter(s) (100 mL/Hr) IV Continuous <Continuous>  temazepam 15 milliGRAM(s) Oral at bedtime    MEDICATIONS  (PRN):  diphenhydrAMINE 25 milliGRAM(s) Oral at bedtime PRN Rash and/or Itching      Allergies    No Known Allergies    Intolerances        Vital Signs Last 24 Hrs  T(C): 36.8 (06 Feb 2020 06:09), Max: 36.8 (06 Feb 2020 06:09)  T(F): 98.3 (06 Feb 2020 06:09), Max: 98.3 (06 Feb 2020 06:09)  HR: 67 (06 Feb 2020 06:09) (67 - 98)  BP: 142/89 (06 Feb 2020 06:09) (92/48 - 150/95)  BP(mean): --  RR: 18 (06 Feb 2020 06:09) (18 - 18)  SpO2: 100% (06 Feb 2020 06:09) (96% - 100%)      PHYSICAL EXAM:    General: Well developed; well nourished; in no acute distress  HEENT: MMM, conjunctiva pink and sclera anicteric.  Lungs: clear to auscultation bilaterally.  Cor: RRR S1, S2 only.  Gastrointestinal: Abdomen: Soft, flat, non-tender non-distended; Normal bowel sounds; No hepatosplenomegaly.  Extremities: no cyanosis, clubbing or edema.  Skin: Warm and dry. No obvious rash  Neuro: Pt. alert to person (unchanged).    LABS:                                RADIOLOGY & ADDITIONAL STUDIES (The following images were personally reviewed):

## 2020-02-06 NOTE — DISCHARGE NOTE PROVIDER - CARE PROVIDER_API CALL
PMD,   Phone: (   )    -  Fax: (   )    -  Follow Up Time: [facility],   Phone: (   )    -  Fax: (   )    -  Follow Up Time: 1 week

## 2020-02-06 NOTE — DISCHARGE NOTE PROVIDER - NSDCCPCAREPLAN_GEN_ALL_CORE_FT
PRINCIPAL DISCHARGE DIAGNOSIS  Diagnosis: Fecal impaction of colon  Assessment and Plan of Treatment: Continue current medications as prescribed.  Follow up with primary doctor.      SECONDARY DISCHARGE DIAGNOSES  Diagnosis: CP (cerebral palsy)  Assessment and Plan of Treatment: Continue current medications as prescribed.  Follow up with primary doctor.    Diagnosis: DVT, lower extremity  Assessment and Plan of Treatment: Continue current medications as prescribed.  Follow up primary doctor.

## 2020-02-06 NOTE — PROGRESS NOTE ADULT - SUBJECTIVE AND OBJECTIVE BOX
CC: Diarrhea (06 Feb 2020 07:35)    HPI:  64yr old male with Schizoaffective, Cerebral palsy, mental retardation, Anemia, Anxiety, h/o DVT in past and recent RLE DVT 1 week ago residing in a private home under foster care - OPWDD was brought in by caregiver Lyn Amaral for many episodes of diarrhea.    INTERVAL HPI/OVERNIGHT EVENTS: Patient seen and examined lying in bed.  Patient had BM last night as per RN.  Patient with unclear speech and unable to answer ROS.      Vital Signs Last 24 Hrs  T(C): 36.5 (06 Feb 2020 08:27), Max: 36.8 (06 Feb 2020 06:09)  T(F): 97.7 (06 Feb 2020 08:27), Max: 98.3 (06 Feb 2020 06:09)  HR: 134 (06 Feb 2020 08:27) (67 - 134)  BP: 134/84 (06 Feb 2020 08:27) (92/48 - 145/93)  BP(mean): --  RR: 18 (06 Feb 2020 08:27) (18 - 18)  SpO2: 98% (06 Feb 2020 08:27) (96% - 100%)  I&O's Detail    06 Feb 2020 07:01  -  06 Feb 2020 15:00  --------------------------------------------------------  IN:    IV PiggyBack: 2 mL  Total IN: 2 mL    OUT:  Total OUT: 0 mL    Total NET: 2 mL    PHYSICAL EXAM:  GENERAL: NAD  HEAD:  Atraumatic, Normocephalic  NECK: Supple, No JVD, Normal thyroid  NERVOUS SYSTEM:  Alert, unclear speech, generalized weakness  CHEST/LUNG: Clear to auscultation bilaterally; No rales, rhonchi, wheezing, or rubs  HEART: Regular rate and rhythm; No murmurs, rubs, or gallops  ABDOMEN: Soft, Nontender, Nondistended; Bowel sounds present  EXTREMITIES:  2+ Peripheral Pulses, No clubbing, cyanosis, or edema        MEDICATIONS  (STANDING):  ascorbic acid 500 milliGRAM(s) Oral daily  enoxaparin Injectable 60 milliGRAM(s) SubCutaneous every 12 hours  lactulose Syrup 20 Gram(s) Oral three times a day  melatonin 3 milliGRAM(s) Oral at bedtime  OLANZapine 10 milliGRAM(s) Oral at bedtime  OLANZapine 2.5 milliGRAM(s) Oral daily  pantoprazole    Tablet 40 milliGRAM(s) Oral before breakfast  polyethylene glycol 3350 17 Gram(s) Oral two times a day  senna 2 Tablet(s) Oral at bedtime  sodium chloride 0.45% with potassium chloride 20 mEq/L 1000 milliLiter(s) (100 mL/Hr) IV Continuous <Continuous>  temazepam 15 milliGRAM(s) Oral at bedtime    MEDICATIONS  (PRN):  diphenhydrAMINE 25 milliGRAM(s) Oral at bedtime PRN Rash and/or Itching

## 2020-02-06 NOTE — PROGRESS NOTE ADULT - ATTENDING COMMENTS
Patient seen and examined. Reports of multiple liquid stools. Abdomen remains benign. Continue bowel regimen and repeat abdominal xray tomorrow
seen and examined , agree with above .  will request for surgery to do digital disimpaction .  continue with bowel regimen .  no need for CTA chest , heart rate improved and regardless patient will be on AC .    GENERAL:  mumbling , incomprehensible speech   RESP:  Non-labored breathing pattern, lungs clear to ausculation in anterior fields  CV: Regular rate and rhythm, no murmurs appreciated  GI: Soft, non-tender, distended , firm   EXT: no pitting edema     no family members at bedside
seen and examined at bedside. chart reviewed. patient is non-communicating. had one big BM today. repeat xray pending. GI signed off. group home meeting scheduled tomorrow. possible DC tomorrow. rest as per NP note
seen and examined. chart reviewed. clinically improving slowly. new laxatives were added and lactulose dose was increased. rest as per NP note
seen and examined. chart reviewed. no BM today. added golytely. patient is refusing time to time. RN to reinforce. rest as per NP note .
Patient seen and examined. Abdominal exam remains benign. Abdominal xray is also improved this morning although there is still stool noted in the rectosigmoid. Continue aggressive bowel regimen. No acute surgical issues and as such colorectal surgery would sign off. Please call with questions of concerns.

## 2020-02-06 NOTE — PROGRESS NOTE ADULT - ASSESSMENT
64yr old male with Schizoaffective, Cerebral palsy, mental retardation, Anemia, Anxiety, h/o DVT in past and recent RLE DVT 1 week prior residing in a private home under foster care - OPWDD was brought in by caregiver Lyn Amaral for many episodes of diarrhea. Patient was diagnosed with RLE DVT 1 week ago when he was found to have RLE swelling - was taken to  - found to have Rt peroneal DVT. In the ER, he was found to be tachycardic in high 110s -persistently after 2 liters of iv Fluids, Labs s/o mildly elevated transaminases. Ct abd pelvis s/o Rectosigmoid distention with possible impacted stool and stercoral colitis.     1. Constipation /stercoral colitis   seen by GI and colorectal surgery   disimpaction unsuccessful .  on bowel regimen , laxatives , enema . S/p GoLytely  will monitor closely .  discontinued antibiotics  Repeat KUB today.    2. Hx of DVT twice   on lovenox.  once stable should be able to switch to NOAC .    3. Dehydration   improved.  monitor.    4. Cerebral palsy/schizoaffective disorder   around his baseline .  continue with home regimen .    5. DVT prophylaxis  on lovenox     dispo: possible discharge 2/7/2020

## 2020-02-07 LAB
ANION GAP SERPL CALC-SCNC: 11 MMOL/L — SIGNIFICANT CHANGE UP (ref 5–17)
BUN SERPL-MCNC: 13 MG/DL — SIGNIFICANT CHANGE UP (ref 8–20)
CALCIUM SERPL-MCNC: 9.5 MG/DL — SIGNIFICANT CHANGE UP (ref 8.6–10.2)
CHLORIDE SERPL-SCNC: 103 MMOL/L — SIGNIFICANT CHANGE UP (ref 98–107)
CO2 SERPL-SCNC: 26 MMOL/L — SIGNIFICANT CHANGE UP (ref 22–29)
CREAT SERPL-MCNC: 0.64 MG/DL — SIGNIFICANT CHANGE UP (ref 0.5–1.3)
GLUCOSE SERPL-MCNC: 132 MG/DL — HIGH (ref 70–99)
POTASSIUM SERPL-MCNC: 4 MMOL/L — SIGNIFICANT CHANGE UP (ref 3.5–5.3)
POTASSIUM SERPL-SCNC: 4 MMOL/L — SIGNIFICANT CHANGE UP (ref 3.5–5.3)
SODIUM SERPL-SCNC: 140 MMOL/L — SIGNIFICANT CHANGE UP (ref 135–145)

## 2020-02-07 PROCEDURE — 99232 SBSQ HOSP IP/OBS MODERATE 35: CPT

## 2020-02-07 RX ORDER — NYSTATIN CREAM 100000 [USP'U]/G
1 CREAM TOPICAL
Refills: 0 | Status: DISCONTINUED | OUTPATIENT
Start: 2020-02-07 | End: 2020-02-08

## 2020-02-07 RX ORDER — PANTOPRAZOLE SODIUM 20 MG/1
1 TABLET, DELAYED RELEASE ORAL
Qty: 30 | Refills: 0
Start: 2020-02-07 | End: 2020-03-07

## 2020-02-07 RX ADMIN — ENOXAPARIN SODIUM 60 MILLIGRAM(S): 100 INJECTION SUBCUTANEOUS at 05:09

## 2020-02-07 RX ADMIN — LACTULOSE 20 GRAM(S): 10 SOLUTION ORAL at 14:15

## 2020-02-07 RX ADMIN — Medication 3 MILLIGRAM(S): at 22:08

## 2020-02-07 RX ADMIN — LACTULOSE 20 GRAM(S): 10 SOLUTION ORAL at 05:09

## 2020-02-07 RX ADMIN — NYSTATIN CREAM 1 APPLICATION(S): 100000 CREAM TOPICAL at 17:42

## 2020-02-07 RX ADMIN — ENOXAPARIN SODIUM 60 MILLIGRAM(S): 100 INJECTION SUBCUTANEOUS at 17:40

## 2020-02-07 RX ADMIN — Medication 500 MILLIGRAM(S): at 14:15

## 2020-02-07 RX ADMIN — POLYETHYLENE GLYCOL 3350 17 GRAM(S): 17 POWDER, FOR SOLUTION ORAL at 22:07

## 2020-02-07 RX ADMIN — OLANZAPINE 10 MILLIGRAM(S): 15 TABLET, FILM COATED ORAL at 22:07

## 2020-02-07 RX ADMIN — POLYETHYLENE GLYCOL 3350 17 GRAM(S): 17 POWDER, FOR SOLUTION ORAL at 05:09

## 2020-02-07 RX ADMIN — PANTOPRAZOLE SODIUM 40 MILLIGRAM(S): 20 TABLET, DELAYED RELEASE ORAL at 05:09

## 2020-02-07 RX ADMIN — SENNA PLUS 2 TABLET(S): 8.6 TABLET ORAL at 22:07

## 2020-02-07 RX ADMIN — LACTULOSE 20 GRAM(S): 10 SOLUTION ORAL at 22:07

## 2020-02-07 RX ADMIN — OLANZAPINE 2.5 MILLIGRAM(S): 15 TABLET, FILM COATED ORAL at 14:16

## 2020-02-07 NOTE — CHART NOTE - NSCHARTNOTEFT_GEN_A_CORE
Colorectal surgery team called to assess recent patient imaging. KUB from 2/6 with questionable sigmoid volvulus.     Subjective: Patient evaluated at bedside and found in no acute distress. Patient with unclear speech and unable to complete ROS but appears comfortable. Per nursing care and primary team, patient has been tolerating diet with no n/v, and having bm. Patient last bm was yesterday and was loose per charting.     Vitals:   T 98.1  HR: 69  BP: 150/93  RR 18  O2 sat: 95RA    PE  Gen: resting comfortably in bed, no acute distress, incoherent speech  Pulm: no increased wob, no conversational dysnpea  Abd: non-distended, soft, no appreciated masses, non-tender to light and deep palpation    A/P:  -attending to review with final recommendations

## 2020-02-07 NOTE — PROGRESS NOTE ADULT - ASSESSMENT
63yo male with fecal impaction of the rectosigmoid colon and AXR done today with concerns for sigmoid volvulus    -CT A/P w/ IV contrast ordered STAT  -If this reveals a sigmoid volvulus, recommend reconsultation with GI team for Colonoscopic decompression/detorsion and rectal tube placement. Patient is a poor surgical candidate and would not recommend an operation at this time.   -If CT is negative for sigmoid volvulus and no other acute pathology identified, then ok to be discharged back to his facility     Final recommendations pending CT results

## 2020-02-07 NOTE — PROGRESS NOTE ADULT - ASSESSMENT
64yr old male with Schizoaffective, Cerebral palsy, mental retardation, Anemia, Anxiety, h/o DVT in past and recent RLE DVT 1 week prior residing in a private home under foster care - [facility] was brought in by caregiver Lyn Amaral for many episodes of diarrhea. Patient was diagnosed with RLE DVT 1 week ago when he was found to have RLE swelling - was taken to  - found to have Rt peroneal DVT. In the ER, he was found to be tachycardic in high 110s -persistently after 2 liters of iv Fluids, Labs s/o mildly elevated transaminases. Ct abd pelvis s/o Rectosigmoid distention with possible impacted stool and stercoral colitis.     1. Constipation /stercoral colitis   seen by GI and colorectal surgery   disimpaction unsuccessful .  on bowel regimen , laxatives , enema . S/p GoLytely  will monitor closely .  discontinued antibiotics  patient had large BM. GI singed off  Repeat KUB on 7/6/20 reportes ?early volvulus  colorectal surgery follow up called    2. Hx of DVT twice   on lovenox.  once stable should be able to switch to NOAC .    3. Dehydration   improved.  monitor.    4. Cerebral palsy/schizoaffective disorder   around his baseline .  continue with home regimen .    5. DVT prophylaxis  on lovenox     dispo: possible discharge today if colorectal surgery clears 64yr old male with Schizoaffective, Cerebral palsy, mental retardation, Anemia, Anxiety, h/o DVT in past and recent RLE DVT 1 week prior residing in a private home under foster care - [facility] was brought in by caregiver Lyn Amaral for many episodes of diarrhea. Patient was diagnosed with RLE DVT 1 week ago when he was found to have RLE swelling - was taken to  - found to have Rt peroneal DVT. In the ER, he was found to be tachycardic in high 110s -persistently after 2 liters of iv Fluids, Labs s/o mildly elevated transaminases. Ct abd pelvis s/o Rectosigmoid distention with possible impacted stool and stercoral colitis.     1. Constipation /stercoral colitis   seen by GI and colorectal surgery   disimpaction unsuccessful .  on bowel regimen , laxatives , enema . S/p GoLytely  will monitor closely .  discontinued antibiotics  patient had large BM. GI singed off  Repeat KUB on 7/6/20 reportes ?early volvulus  colorectal surgery follow up noted. suggested CT abd/pelvis with IC. if sigmoid volvulous--> call GI for colonoscopic decompression as patient is not a good candidate for surgery. IF negative and no other acute issues, then he can be discharged.   Spoke to [facility] director Graciela Torres [ [phone]]. discussed about his current conditions, plan of care, risk/benefit/alternative of CT with IV contrast. she provided verbal consent for the same    2. Hx of DVT twice   on lovenox.  once stable should be able to switch to NOAC .    3. Dehydration   improved.  monitor.  enforce oral hydration    4. Cerebral palsy/schizoaffective disorder   around his baseline .  continue with home regimen .    5. DVT prophylaxis  on lovenox     dispo: DC pending CT and clearance from CRC after CT abd/pelvis. anticipates DC on TUesday. [facility] needs 24 hrs notice for DC    updated Patient care provider Lyn and [facility] GILBERTO Gant [ [phone]] and [facility] director Graciela about current condition. plan of care and DC planning. they verbalized understanding and agreed with the plan,   patient is unable to provide any consent. if consent is needed, as per [facility], needs to call Director Graciela Torres [ [phone]] for verbal consent. In case of Emergency, two physician consents will be valid.

## 2020-02-07 NOTE — PROGRESS NOTE ADULT - SUBJECTIVE AND OBJECTIVE BOX
INTERVAL PROGRESS NOTE: Colorectal team was reconsulted due to abdominal XR findings concerning for possible sigmoid volvulus. Patient has no new complaints. Resting comfortably. Per nurse, no nausea/vomiting. Patient has had multiple bowel movements today.     MEDICATIONS  (STANDING):  ascorbic acid 500 milliGRAM(s) Oral daily  enoxaparin Injectable 60 milliGRAM(s) SubCutaneous every 12 hours  lactulose Syrup 20 Gram(s) Oral three times a day  melatonin 3 milliGRAM(s) Oral at bedtime  nystatin Powder 1 Application(s) Topical two times a day  OLANZapine 10 milliGRAM(s) Oral at bedtime  OLANZapine 2.5 milliGRAM(s) Oral daily  pantoprazole    Tablet 40 milliGRAM(s) Oral before breakfast  polyethylene glycol 3350 17 Gram(s) Oral two times a day  senna 2 Tablet(s) Oral at bedtime  sodium chloride 0.45% with potassium chloride 20 mEq/L 1000 milliLiter(s) (100 mL/Hr) IV Continuous <Continuous>    MEDICATIONS  (PRN):  diphenhydrAMINE 25 milliGRAM(s) Oral at bedtime PRN Rash and/or Itching      Vital Signs Last 24 Hrs  T(C): 36.7 (07 Feb 2020 07:21), Max: 36.7 (06 Feb 2020 22:09)  T(F): 98.1 (07 Feb 2020 07:21), Max: 98.1 (07 Feb 2020 07:21)  HR: 69 (07 Feb 2020 07:21) (69 - 89)  BP: 150/93 (07 Feb 2020 07:21) (135/80 - 150/93)  BP(mean): --  RR: 18 (07 Feb 2020 07:21) (18 - 18)  SpO2: 95% (06 Feb 2020 22:09) (95% - 95%)    PE  GEN: patient resting comfortably in bed, in no acute distress  RESP: respirations are unlabored, no accessory muscle use, no conversational dyspnea  CVS: Regular rate and rhythm  GI: Abdomen soft, non-tender, non distended, no rebound tenderness / guarding  Ext: No edema      I&O's Detail    06 Feb 2020 07:01  -  07 Feb 2020 07:00  --------------------------------------------------------  IN:    IV PiggyBack: 2 mL  Total IN: 2 mL    OUT:  Total OUT: 0 mL    Total NET: 2 mL          LABS:                RADIOLOGY & ADDITIONAL STUDIES:

## 2020-02-07 NOTE — PROGRESS NOTE ADULT - SUBJECTIVE AND OBJECTIVE BOX
CC: Diarrhea (06 Feb 2020 07:35)    HPI:  64yr old male with Schizoaffective, Cerebral palsy, mental retardation, Anemia, Anxiety, h/o DVT in past and recent RLE DVT 1 week ago residing in a private home under foster care - [facility] was brought in by caregiver Lyn Amaral for many episodes of diarrhea.    INTERVAL HPI/OVERNIGHT EVENTS: Patient seen and examined lying in bed.  Patient had BM last night and this am. Xray KUB done last night reported this am reported ?early volvulus. colorectal surgery was recalled.        Vital Signs Last 24 Hrs  T(C): 36.7 (07 Feb 2020 07:21), Max: 36.7 (06 Feb 2020 22:09)  T(F): 98.1 (07 Feb 2020 07:21), Max: 98.1 (07 Feb 2020 07:21)  HR: 69 (07 Feb 2020 07:21) (69 - 89)  BP: 150/93 (07 Feb 2020 07:21) (135/80 - 150/93)  BP(mean): --  RR: 18 (07 Feb 2020 07:21) (18 - 18)  SpO2: 95% (06 Feb 2020 22:09) (95% - 95%)      CAPILLARY BLOOD GLUCOSE      I&O's Detail    06 Feb 2020 07:01  -  07 Feb 2020 07:00  --------------------------------------------------------  IN:    IV PiggyBack: 2 mL  Total IN: 2 mL    OUT:  Total OUT: 0 mL    Total NET: 2 mL      GENERAL: Not in distress. Alert    HEENT:  Normocephalic and atraumatic.   NECK: Supple.   CARDIOVASCULAR: RRR S1, S2. No murmur/rubs/gallop  LUNGS: BLAE+, no rales, no wheezing, no rhonchi.    ABDOMEN: mildly distended. Soft,  NT, no guarding / rebound / rigidity. BS normoactive. No CVA tenderness.    EXTREMITIES: no cyanosis, no clubbing, no edema. no leg or calf TP  SKIN: warm and dry  NEUROLOGIC: alert. incomprehensible speech. moved limbs . flexion deformity b/l UE  PSYCHIATRIC: Calm.  No agitation.      MEDICATIONS  (STANDING):  ascorbic acid 500 milliGRAM(s) Oral daily  enoxaparin Injectable 60 milliGRAM(s) SubCutaneous every 12 hours  lactulose Syrup 20 Gram(s) Oral three times a day  melatonin 3 milliGRAM(s) Oral at bedtime  nystatin Powder 1 Application(s) Topical two times a day  OLANZapine 10 milliGRAM(s) Oral at bedtime  OLANZapine 2.5 milliGRAM(s) Oral daily  pantoprazole    Tablet 40 milliGRAM(s) Oral before breakfast  polyethylene glycol 3350 17 Gram(s) Oral two times a day  senna 2 Tablet(s) Oral at bedtime  sodium chloride 0.45% with potassium chloride 20 mEq/L 1000 milliLiter(s) (100 mL/Hr) IV Continuous <Continuous>    MEDICATIONS  (PRN):  diphenhydrAMINE 25 milliGRAM(s) Oral at bedtime PRN Rash and/or Itching

## 2020-02-08 ENCOUNTER — TRANSCRIPTION ENCOUNTER (OUTPATIENT)
Age: 65
End: 2020-02-08

## 2020-02-08 VITALS
TEMPERATURE: 99 F | DIASTOLIC BLOOD PRESSURE: 82 MMHG | HEART RATE: 110 BPM | OXYGEN SATURATION: 97 % | RESPIRATION RATE: 18 BRPM | SYSTOLIC BLOOD PRESSURE: 130 MMHG

## 2020-02-08 LAB
ANION GAP SERPL CALC-SCNC: 14 MMOL/L — SIGNIFICANT CHANGE UP (ref 5–17)
BUN SERPL-MCNC: 9 MG/DL — SIGNIFICANT CHANGE UP (ref 8–20)
CALCIUM SERPL-MCNC: 9.7 MG/DL — SIGNIFICANT CHANGE UP (ref 8.6–10.2)
CHLORIDE SERPL-SCNC: 104 MMOL/L — SIGNIFICANT CHANGE UP (ref 98–107)
CO2 SERPL-SCNC: 27 MMOL/L — SIGNIFICANT CHANGE UP (ref 22–29)
CREAT SERPL-MCNC: 0.58 MG/DL — SIGNIFICANT CHANGE UP (ref 0.5–1.3)
GLUCOSE SERPL-MCNC: 120 MG/DL — HIGH (ref 70–99)
HCT VFR BLD CALC: 52 % — HIGH (ref 39–50)
HGB BLD-MCNC: 17 G/DL — SIGNIFICANT CHANGE UP (ref 13–17)
MCHC RBC-ENTMCNC: 29.8 PG — SIGNIFICANT CHANGE UP (ref 27–34)
MCHC RBC-ENTMCNC: 32.7 GM/DL — SIGNIFICANT CHANGE UP (ref 32–36)
MCV RBC AUTO: 91.2 FL — SIGNIFICANT CHANGE UP (ref 80–100)
PLATELET # BLD AUTO: 176 K/UL — SIGNIFICANT CHANGE UP (ref 150–400)
POTASSIUM SERPL-MCNC: 4.2 MMOL/L — SIGNIFICANT CHANGE UP (ref 3.5–5.3)
POTASSIUM SERPL-SCNC: 4.2 MMOL/L — SIGNIFICANT CHANGE UP (ref 3.5–5.3)
RBC # BLD: 5.7 M/UL — SIGNIFICANT CHANGE UP (ref 4.2–5.8)
RBC # FLD: 13.3 % — SIGNIFICANT CHANGE UP (ref 10.3–14.5)
SODIUM SERPL-SCNC: 145 MMOL/L — SIGNIFICANT CHANGE UP (ref 135–145)
WBC # BLD: 4.1 K/UL — SIGNIFICANT CHANGE UP (ref 3.8–10.5)
WBC # FLD AUTO: 4.1 K/UL — SIGNIFICANT CHANGE UP (ref 3.8–10.5)

## 2020-02-08 PROCEDURE — 74177 CT ABD & PELVIS W/CONTRAST: CPT

## 2020-02-08 PROCEDURE — 83735 ASSAY OF MAGNESIUM: CPT

## 2020-02-08 PROCEDURE — 85027 COMPLETE CBC AUTOMATED: CPT

## 2020-02-08 PROCEDURE — 74018 RADEX ABDOMEN 1 VIEW: CPT

## 2020-02-08 PROCEDURE — 80048 BASIC METABOLIC PNL TOTAL CA: CPT

## 2020-02-08 PROCEDURE — 74177 CT ABD & PELVIS W/CONTRAST: CPT | Mod: 26

## 2020-02-08 PROCEDURE — 96374 THER/PROPH/DIAG INJ IV PUSH: CPT | Mod: XU

## 2020-02-08 PROCEDURE — 99232 SBSQ HOSP IP/OBS MODERATE 35: CPT

## 2020-02-08 PROCEDURE — 80053 COMPREHEN METABOLIC PANEL: CPT

## 2020-02-08 PROCEDURE — 86803 HEPATITIS C AB TEST: CPT

## 2020-02-08 PROCEDURE — 99285 EMERGENCY DEPT VISIT HI MDM: CPT | Mod: 25

## 2020-02-08 PROCEDURE — 74018 RADEX ABDOMEN 1 VIEW: CPT | Mod: 26

## 2020-02-08 PROCEDURE — 81001 URINALYSIS AUTO W/SCOPE: CPT

## 2020-02-08 PROCEDURE — 93005 ELECTROCARDIOGRAM TRACING: CPT

## 2020-02-08 PROCEDURE — 87493 C DIFF AMPLIFIED PROBE: CPT

## 2020-02-08 PROCEDURE — 83690 ASSAY OF LIPASE: CPT

## 2020-02-08 PROCEDURE — 36415 COLL VENOUS BLD VENIPUNCTURE: CPT

## 2020-02-08 RX ORDER — LACTULOSE 10 G/15ML
30 SOLUTION ORAL
Qty: 600 | Refills: 3
Start: 2020-02-08 | End: 2020-06-06

## 2020-02-08 RX ORDER — NYSTATIN CREAM 100000 [USP'U]/G
1 CREAM TOPICAL
Qty: 30 | Refills: 0
Start: 2020-02-08 | End: 2020-03-08

## 2020-02-08 RX ADMIN — Medication 500 MILLIGRAM(S): at 13:42

## 2020-02-08 RX ADMIN — LACTULOSE 20 GRAM(S): 10 SOLUTION ORAL at 13:43

## 2020-02-08 RX ADMIN — NYSTATIN CREAM 1 APPLICATION(S): 100000 CREAM TOPICAL at 15:42

## 2020-02-08 RX ADMIN — OLANZAPINE 2.5 MILLIGRAM(S): 15 TABLET, FILM COATED ORAL at 13:43

## 2020-02-08 RX ADMIN — NYSTATIN CREAM 1 APPLICATION(S): 100000 CREAM TOPICAL at 05:00

## 2020-02-08 RX ADMIN — ENOXAPARIN SODIUM 60 MILLIGRAM(S): 100 INJECTION SUBCUTANEOUS at 05:00

## 2020-02-08 RX ADMIN — POLYETHYLENE GLYCOL 3350 17 GRAM(S): 17 POWDER, FOR SOLUTION ORAL at 16:06

## 2020-02-08 RX ADMIN — POLYETHYLENE GLYCOL 3350 17 GRAM(S): 17 POWDER, FOR SOLUTION ORAL at 05:01

## 2020-02-08 RX ADMIN — LACTULOSE 20 GRAM(S): 10 SOLUTION ORAL at 05:01

## 2020-02-08 RX ADMIN — ENOXAPARIN SODIUM 60 MILLIGRAM(S): 100 INJECTION SUBCUTANEOUS at 16:06

## 2020-02-08 RX ADMIN — PANTOPRAZOLE SODIUM 40 MILLIGRAM(S): 20 TABLET, DELAYED RELEASE ORAL at 05:00

## 2020-02-08 NOTE — PROGRESS NOTE ADULT - ASSESSMENT
64yr old male with Schizoaffective, Cerebral palsy, mental retardation, Anemia, Anxiety, h/o DVT in past and recent RLE DVT 1 week prior residing in a private home under foster care - [facility] was brought in by caregiver Lyn Amaral for many episodes of diarrhea. Patient was diagnosed with RLE DVT 1 week ago when he was found to have RLE swelling - was taken to  - found to have Rt peroneal DVT. In the ER, he was found to be tachycardic in high 110s -persistently after 2 liters of iv Fluids, Labs s/o mildly elevated transaminases. Ct abd pelvis s/o Rectosigmoid distention with possible impacted stool and stercoral colitis.     #Constipation /stercoral colitis   - seen by GI and colorectal surgery  - disimpaction unsuccessful originally and patient started on on bowel regimen , laxatives , enema . S/p GoLytely  - discontinued antibiotics  - patient had large BM and GI singed off  - Repeat KUB on 2/6/20 reportes ?early volvulus  - colorectal surgery follow up noted and they recommended CT abd/pelvis with IC. which showed no signs of volvulus; rectum with stool distended up to 10cm but patient having BM  - he is without symptoms at this time and having BM  - patient appropriate for discharge    #Hx of DVT twice   on lovenox.  once stable should be able to switch to NOAC .    #Dehydration   improved.  monitor.  enforce oral hydration    #Cerebral palsy/schizoaffective disorder   around his baseline .  continue with home regimen .    # DVT prophylaxis  on lovenox     dispo: DC planning. Anticipates DC. [facility] needs 24 hrs notice for DC

## 2020-02-08 NOTE — PROGRESS NOTE ADULT - SUBJECTIVE AND OBJECTIVE BOX
SUBJECTIVE: CLAUDE overnight. Denies pain. Resting comfortably. Per nurse, denies nausea/vomiting.       MEDICATIONS  (STANDING):  ascorbic acid 500 milliGRAM(s) Oral daily  enoxaparin Injectable 60 milliGRAM(s) SubCutaneous every 12 hours  lactulose Syrup 20 Gram(s) Oral three times a day  melatonin 3 milliGRAM(s) Oral at bedtime  nystatin Powder 1 Application(s) Topical two times a day  OLANZapine 10 milliGRAM(s) Oral at bedtime  OLANZapine 2.5 milliGRAM(s) Oral daily  pantoprazole    Tablet 40 milliGRAM(s) Oral before breakfast  polyethylene glycol 3350 17 Gram(s) Oral two times a day  senna 2 Tablet(s) Oral at bedtime  sodium chloride 0.45% with potassium chloride 20 mEq/L 1000 milliLiter(s) (100 mL/Hr) IV Continuous <Continuous>    MEDICATIONS  (PRN):  diphenhydrAMINE 25 milliGRAM(s) Oral at bedtime PRN Rash and/or Itching      Vital Signs Last 24 Hrs  T(C): 36.7 (07 Feb 2020 22:04), Max: 37.3 (07 Feb 2020 16:18)  T(F): 98.1 (07 Feb 2020 22:04), Max: 99.1 (07 Feb 2020 16:18)  HR: 78 (07 Feb 2020 22:04) (69 - 78)  BP: 127/78 (07 Feb 2020 22:04) (127/78 - 150/93)  BP(mean): --  RR: 18 (07 Feb 2020 22:04) (18 - 18)  SpO2: 96% (07 Feb 2020 22:04) (96% - 96%)    PE  GEN: patient resting comfortably in bed, in no acute distress  RESP: respirations are unlabored, no accessory muscle use, no conversational dyspnea  CVS: Regular rate and rhythm  GI: Abdomen soft, non-tender, non distended, no rebound tenderness / guarding  Ext: No edema      I&O's Detail    06 Feb 2020 07:01  -  07 Feb 2020 07:00  --------------------------------------------------------  IN:    IV PiggyBack: 2 mL  Total IN: 2 mL    OUT:  Total OUT: 0 mL    Total NET: 2 mL          LABS:    02-07    140  |  103  |  13.0  ----------------------------<  132<H>  4.0   |  26.0  |  0.64    Ca    9.5      07 Feb 2020 18:03            RADIOLOGY & ADDITIONAL STUDIES:

## 2020-02-08 NOTE — CHART NOTE - NSCHARTNOTEFT_GEN_A_CORE
Source: Patient [ ]  Family [ ]   other [ x]    Current Diet: Diet, Dysphagia 1 Pureed-Thin Liquids:   Supplement Feeding Modality:  Oral  Ensure Enlive Servings Per Day:  1       Frequency:  Three Times a day (02-06-20 @ 14:26)    Patient reports [ ] nausea  [ ] vomiting [ ] diarrhea [ ] constipation  [ ]chewing problems [ ] swallowing issues  [ ] other:     PO intake:  < 50% [ ]   50-75%  [x ]   %  [ ]  other :    Source for PO intake [ ] Patient [ ] family [ x] chart [ ] staff [ x] other    Current Weight:  1/30/2020: 150 pounds  No new weight    Edema: 1+ right leg    Pertinent Medications: MEDICATIONS  (STANDING):  ascorbic acid 500 milliGRAM(s) Oral daily  enoxaparin Injectable 60 milliGRAM(s) SubCutaneous every 12 hours  lactulose Syrup 20 Gram(s) Oral three times a day  melatonin 3 milliGRAM(s) Oral at bedtime  nystatin Powder 1 Application(s) Topical two times a day  OLANZapine 10 milliGRAM(s) Oral at bedtime  OLANZapine 2.5 milliGRAM(s) Oral daily  pantoprazole    Tablet 40 milliGRAM(s) Oral before breakfast  polyethylene glycol 3350 17 Gram(s) Oral two times a day  senna 2 Tablet(s) Oral at bedtime  sodium chloride 0.45% with potassium chloride 20 mEq/L 1000 milliLiter(s) (100 mL/Hr) IV Continuous <Continuous>    MEDICATIONS  (PRN):  diphenhydrAMINE 25 milliGRAM(s) Oral at bedtime PRN Rash and/or Itching    Labs: 02-08 Na145 mmol/L Glu 120 mg/dL<H> K+ 4.2 mmol/L Cr  0.58 mg/dL BUN 9.0 mg/dL Phos n/a   Alb n/a   PAB n/a       Pertinent Labs: CBC Full  -  ( 08 Feb 2020 08:47 )  WBC Count : 4.10 K/uL  RBC Count : 5.70 M/uL  Hemoglobin : 17.0 g/dL  Hematocrit : 52.0 %  Platelet Count - Automated : 176 K/uL  Mean Cell Volume : 91.2 fl  Mean Cell Hemoglobin : 29.8 pg  Mean Cell Hemoglobin Concentration : 32.7 gm/dL  Auto Neutrophil # : x  Auto Lymphocyte # : x  Auto Monocyte # : x  Auto Eosinophil # : x  Auto Basophil # : x  Auto Neutrophil % : x  Auto Lymphocyte % : x  Auto Monocyte % : x  Auto Eosinophil % : x  Auto Basophil % : x    Skin: moisture associated dermatitis    Nutrition focused physical exam previously conducted - found signs of malnutrition [ ]absent [ x]present    Subcutaneous fat loss: [x ] Orbital fat pads region, [x ]Buccal fat region, [ ]Triceps region,  [ ]Ribs region    Muscle wasting: [ ]Temples region, [x]Clavicle region, [ ]Shoulder region, [ ]Scapula region, [ ]Interosseous region,  [ ]thigh region, [ ]Calf region    Estimated Needs:   [x ] no change since previous assessment  [ ] recalculated:     Current Nutrition Diagnosis: Malnutrition mild chronic related to inadequate protein calorie intake in the setting of psychiatric disorder and difficulty swallowing, as evidenced by muscle loss (clavicle) and fat loss (orbital and buccal).  Pt unable to provide any nutrition information. Observed breakfast tray, >75% intake.     Recommendations: Continue current diet as tolerated and Ensure Enlive TID.    Monitoring and Evaluation:   [ x] PO intake [ ] Tolerance to diet prescription [X] Weights  [X] Follow up per protocol [X] Labs

## 2020-02-08 NOTE — PROGRESS NOTE ADULT - SUBJECTIVE AND OBJECTIVE BOX
CC: Diarrhea (08 Feb 2020 05:01)    INTERVAL HPI/OVERNIGHT EVENTS:  patient without acute events  no complaints  abdominal pain improved  tolerating diet    Vital Signs Last 24 Hrs  T(C): 36.4 (08 Feb 2020 07:51), Max: 37.3 (07 Feb 2020 16:18)  T(F): 97.5 (08 Feb 2020 07:51), Max: 99.1 (07 Feb 2020 16:18)  HR: 87 (08 Feb 2020 07:51) (75 - 87)  BP: 148/89 (08 Feb 2020 07:51) (127/78 - 148/89)  BP(mean): --  RR: 18 (08 Feb 2020 07:51) (18 - 18)  SpO2: 98% (08 Feb 2020 07:51) (96% - 98%)    PHYSICAL EXAM:  General: in no acute distress  Eyes: PERRLA, EOMI; conjunctiva and sclera clear  Respiratory: No wheezes, rales or rhonchi  Cardiovascular: Regular rate and rhythm.   Gastrointestinal: Soft non-tender non-distended; Normal bowel sounds  Genitourinary: No costovertebral angle tenderness  Extremities: contorted, patient with scoliosis, limbs with some atrophy in LE from immobility; functional quadriplegia due to contractions  Vascular: Peripheral pulses palpable 2+ bilaterally  Neurological: Alert but unable to assess if oriented; follows simple commands  Psychiatric: Cooperative and appropriate    I&O's Detail               17.0   4.10  )-----------( 176      ( 08 Feb 2020 08:47 )             52.0     08 Feb 2020 08:47    145    |  104    |  9.0    ----------------------------<  120    4.2     |  27.0   |  0.58     Ca    9.7        08 Feb 2020 08:47    CAPILLARY BLOOD GLUCOSE    MEDICATIONS  (STANDING):  ascorbic acid 500 milliGRAM(s) Oral daily  enoxaparin Injectable 60 milliGRAM(s) SubCutaneous every 12 hours  lactulose Syrup 20 Gram(s) Oral three times a day  melatonin 3 milliGRAM(s) Oral at bedtime  nystatin Powder 1 Application(s) Topical two times a day  OLANZapine 10 milliGRAM(s) Oral at bedtime  OLANZapine 2.5 milliGRAM(s) Oral daily  pantoprazole    Tablet 40 milliGRAM(s) Oral before breakfast  polyethylene glycol 3350 17 Gram(s) Oral two times a day  senna 2 Tablet(s) Oral at bedtime  sodium chloride 0.45% with potassium chloride 20 mEq/L 1000 milliLiter(s) (100 mL/Hr) IV Continuous <Continuous>    MEDICATIONS  (PRN):  diphenhydrAMINE 25 milliGRAM(s) Oral at bedtime PRN Rash and/or Itching      RADIOLOGY & ADDITIONAL TESTS:

## 2020-02-08 NOTE — DISCHARGE NOTE NURSING/CASE MANAGEMENT/SOCIAL WORK - PATIENT PORTAL LINK FT
You can access the FollowMyHealth Patient Portal offered by [facility] by registering at the following website: [email or web address]. By joining [employer]’s FollowMyHealth portal, you will also be able to view your health information using other applications (apps) compatible with our system.

## 2020-02-08 NOTE — PROGRESS NOTE ADULT - ASSESSMENT
63yo male with fecal impaction of the rectosigmoid colon, no evidence of sigmoid volvulus on CT scan    -CT A/P reviewed, no evidence of sigmoid volvulus. Fecal impaction within rectosigmoid area significantly improved from previous CT scan  -Recommend continue aggressive bowel regimen outlined by GI team  -Ok to be discharged back to his facility   -No surgical intervention needed. Will sign off. Thank you

## 2020-02-08 NOTE — PROGRESS NOTE ADULT - PROVIDER SPECIALTY LIST ADULT
Colorectal Surgery
Gastroenterology
Hospitalist
Surgery
Gastroenterology

## 2020-05-24 NOTE — ED PROVIDER NOTE - NOTES
Dr. Timmons suggests we dc patient on Lovenox, admission not necessary. ~NOHEMY Shin
(1) Oriented to own ability

## 2020-08-18 ENCOUNTER — INPATIENT (INPATIENT)
Facility: HOSPITAL | Age: 65
LOS: 6 days | Discharge: HOME | DRG: 392 | End: 2020-08-25
Attending: HOSPITALIST | Admitting: INTERNAL MEDICINE
Payer: MEDICARE

## 2020-08-18 VITALS
SYSTOLIC BLOOD PRESSURE: 135 MMHG | HEART RATE: 104 BPM | DIASTOLIC BLOOD PRESSURE: 101 MMHG | OXYGEN SATURATION: 94 % | RESPIRATION RATE: 20 BRPM | TEMPERATURE: 98 F

## 2020-08-18 DIAGNOSIS — K52.9 NONINFECTIVE GASTROENTERITIS AND COLITIS, UNSPECIFIED: ICD-10-CM

## 2020-08-18 LAB
ALBUMIN SERPL ELPH-MCNC: 4 G/DL — SIGNIFICANT CHANGE UP (ref 3.3–5.2)
ALP SERPL-CCNC: 112 U/L — SIGNIFICANT CHANGE UP (ref 40–120)
ALT FLD-CCNC: 11 U/L — SIGNIFICANT CHANGE UP
ANION GAP SERPL CALC-SCNC: 16 MMOL/L — SIGNIFICANT CHANGE UP (ref 5–17)
APPEARANCE UR: CLEAR — SIGNIFICANT CHANGE UP
AST SERPL-CCNC: 19 U/L — SIGNIFICANT CHANGE UP
BACTERIA # UR AUTO: ABNORMAL
BASOPHILS # BLD AUTO: 0.03 K/UL — SIGNIFICANT CHANGE UP (ref 0–0.2)
BASOPHILS NFR BLD AUTO: 0.3 % — SIGNIFICANT CHANGE UP (ref 0–2)
BILIRUB SERPL-MCNC: 0.8 MG/DL — SIGNIFICANT CHANGE UP (ref 0.4–2)
BILIRUB UR-MCNC: NEGATIVE — SIGNIFICANT CHANGE UP
BUN SERPL-MCNC: 13 MG/DL — SIGNIFICANT CHANGE UP (ref 8–20)
C DIFF BY PCR RESULT: SIGNIFICANT CHANGE UP
C DIFF TOX GENS STL QL NAA+PROBE: SIGNIFICANT CHANGE UP
CALCIUM SERPL-MCNC: 9.6 MG/DL — SIGNIFICANT CHANGE UP (ref 8.6–10.2)
CHLORIDE SERPL-SCNC: 99 MMOL/L — SIGNIFICANT CHANGE UP (ref 98–107)
CO2 SERPL-SCNC: 19 MMOL/L — LOW (ref 22–29)
COLOR SPEC: YELLOW — SIGNIFICANT CHANGE UP
CREAT SERPL-MCNC: 0.63 MG/DL — SIGNIFICANT CHANGE UP (ref 0.5–1.3)
DIFF PNL FLD: ABNORMAL
EOSINOPHIL # BLD AUTO: 0.05 K/UL — SIGNIFICANT CHANGE UP (ref 0–0.5)
EOSINOPHIL NFR BLD AUTO: 0.5 % — SIGNIFICANT CHANGE UP (ref 0–6)
EPI CELLS # UR: SIGNIFICANT CHANGE UP
GLUCOSE SERPL-MCNC: 117 MG/DL — HIGH (ref 70–99)
GLUCOSE UR QL: NEGATIVE MG/DL — SIGNIFICANT CHANGE UP
HCT VFR BLD CALC: 56.6 % — HIGH (ref 39–50)
HGB BLD-MCNC: 19.3 G/DL — CRITICAL HIGH (ref 13–17)
IMM GRANULOCYTES NFR BLD AUTO: 0.4 % — SIGNIFICANT CHANGE UP (ref 0–1.5)
KETONES UR-MCNC: NEGATIVE — SIGNIFICANT CHANGE UP
LACTATE BLDV-MCNC: 2.1 MMOL/L — HIGH (ref 0.5–2)
LEUKOCYTE ESTERASE UR-ACNC: ABNORMAL
LIDOCAIN IGE QN: 13 U/L — LOW (ref 22–51)
LYMPHOCYTES # BLD AUTO: 1.26 K/UL — SIGNIFICANT CHANGE UP (ref 1–3.3)
LYMPHOCYTES # BLD AUTO: 11.9 % — LOW (ref 13–44)
MAGNESIUM SERPL-MCNC: 2.2 MG/DL — SIGNIFICANT CHANGE UP (ref 1.6–2.6)
MCHC RBC-ENTMCNC: 29.8 PG — SIGNIFICANT CHANGE UP (ref 27–34)
MCHC RBC-ENTMCNC: 34.1 GM/DL — SIGNIFICANT CHANGE UP (ref 32–36)
MCV RBC AUTO: 87.5 FL — SIGNIFICANT CHANGE UP (ref 80–100)
MONOCYTES # BLD AUTO: 0.79 K/UL — SIGNIFICANT CHANGE UP (ref 0–0.9)
MONOCYTES NFR BLD AUTO: 7.5 % — SIGNIFICANT CHANGE UP (ref 2–14)
NEUTROPHILS # BLD AUTO: 8.38 K/UL — HIGH (ref 1.8–7.4)
NEUTROPHILS NFR BLD AUTO: 79.4 % — HIGH (ref 43–77)
NITRITE UR-MCNC: NEGATIVE — SIGNIFICANT CHANGE UP
PH UR: 5 — SIGNIFICANT CHANGE UP (ref 5–8)
PLATELET # BLD AUTO: 178 K/UL — SIGNIFICANT CHANGE UP (ref 150–400)
POTASSIUM SERPL-MCNC: 4.4 MMOL/L — SIGNIFICANT CHANGE UP (ref 3.5–5.3)
POTASSIUM SERPL-SCNC: 4.4 MMOL/L — SIGNIFICANT CHANGE UP (ref 3.5–5.3)
PROT SERPL-MCNC: 7.1 G/DL — SIGNIFICANT CHANGE UP (ref 6.6–8.7)
PROT UR-MCNC: 15 MG/DL
RBC # BLD: 6.47 M/UL — HIGH (ref 4.2–5.8)
RBC # FLD: 13.1 % — SIGNIFICANT CHANGE UP (ref 10.3–14.5)
RBC CASTS # UR COMP ASSIST: ABNORMAL /HPF (ref 0–4)
SODIUM SERPL-SCNC: 134 MMOL/L — LOW (ref 135–145)
SP GR SPEC: 1.01 — SIGNIFICANT CHANGE UP (ref 1.01–1.02)
UROBILINOGEN FLD QL: 4 MG/DL
WBC # BLD: 10.55 K/UL — HIGH (ref 3.8–10.5)
WBC # FLD AUTO: 10.55 K/UL — HIGH (ref 3.8–10.5)
WBC UR QL: SIGNIFICANT CHANGE UP

## 2020-08-18 PROCEDURE — 74177 CT ABD & PELVIS W/CONTRAST: CPT | Mod: 26

## 2020-08-18 PROCEDURE — 99223 1ST HOSP IP/OBS HIGH 75: CPT | Mod: AI

## 2020-08-18 PROCEDURE — 99284 EMERGENCY DEPT VISIT MOD MDM: CPT

## 2020-08-18 PROCEDURE — 99223 1ST HOSP IP/OBS HIGH 75: CPT

## 2020-08-18 RX ORDER — SODIUM CHLORIDE 9 MG/ML
1000 INJECTION INTRAMUSCULAR; INTRAVENOUS; SUBCUTANEOUS ONCE
Refills: 0 | Status: COMPLETED | OUTPATIENT
Start: 2020-08-18 | End: 2020-08-18

## 2020-08-18 RX ORDER — TEMAZEPAM 15 MG/1
15 CAPSULE ORAL AT BEDTIME
Refills: 0 | Status: DISCONTINUED | OUTPATIENT
Start: 2020-08-18 | End: 2020-08-23

## 2020-08-18 RX ORDER — SODIUM CHLORIDE 9 MG/ML
2000 INJECTION INTRAMUSCULAR; INTRAVENOUS; SUBCUTANEOUS ONCE
Refills: 0 | Status: COMPLETED | OUTPATIENT
Start: 2020-08-18 | End: 2020-08-18

## 2020-08-18 RX ORDER — RIVAROXABAN 15 MG-20MG
20 KIT ORAL
Refills: 0 | Status: DISCONTINUED | OUTPATIENT
Start: 2020-08-18 | End: 2020-08-25

## 2020-08-18 RX ORDER — PANTOPRAZOLE SODIUM 20 MG/1
40 TABLET, DELAYED RELEASE ORAL
Refills: 0 | Status: DISCONTINUED | OUTPATIENT
Start: 2020-08-18 | End: 2020-08-25

## 2020-08-18 RX ORDER — OLANZAPINE 15 MG/1
10 TABLET, FILM COATED ORAL AT BEDTIME
Refills: 0 | Status: DISCONTINUED | OUTPATIENT
Start: 2020-08-18 | End: 2020-08-25

## 2020-08-18 RX ORDER — OLANZAPINE 15 MG/1
2.5 TABLET, FILM COATED ORAL DAILY
Refills: 0 | Status: DISCONTINUED | OUTPATIENT
Start: 2020-08-18 | End: 2020-08-25

## 2020-08-18 RX ORDER — METRONIDAZOLE 500 MG
500 TABLET ORAL ONCE
Refills: 0 | Status: COMPLETED | OUTPATIENT
Start: 2020-08-18 | End: 2020-08-18

## 2020-08-18 RX ORDER — METRONIDAZOLE 500 MG
500 TABLET ORAL EVERY 8 HOURS
Refills: 0 | Status: DISCONTINUED | OUTPATIENT
Start: 2020-08-18 | End: 2020-08-25

## 2020-08-18 RX ORDER — LANOLIN ALCOHOL/MO/W.PET/CERES
3 CREAM (GRAM) TOPICAL AT BEDTIME
Refills: 0 | Status: DISCONTINUED | OUTPATIENT
Start: 2020-08-18 | End: 2020-08-25

## 2020-08-18 RX ORDER — CIPROFLOXACIN LACTATE 400MG/40ML
400 VIAL (ML) INTRAVENOUS EVERY 12 HOURS
Refills: 0 | Status: DISCONTINUED | OUTPATIENT
Start: 2020-08-18 | End: 2020-08-25

## 2020-08-18 RX ORDER — SODIUM CHLORIDE 9 MG/ML
1000 INJECTION INTRAMUSCULAR; INTRAVENOUS; SUBCUTANEOUS
Refills: 0 | Status: DISCONTINUED | OUTPATIENT
Start: 2020-08-18 | End: 2020-08-19

## 2020-08-18 RX ADMIN — SODIUM CHLORIDE 1000 MILLILITER(S): 9 INJECTION INTRAMUSCULAR; INTRAVENOUS; SUBCUTANEOUS at 07:35

## 2020-08-18 RX ADMIN — Medication 3 MILLIGRAM(S): at 21:42

## 2020-08-18 RX ADMIN — SODIUM CHLORIDE 100 MILLILITER(S): 9 INJECTION INTRAMUSCULAR; INTRAVENOUS; SUBCUTANEOUS at 12:18

## 2020-08-18 RX ADMIN — OLANZAPINE 2.5 MILLIGRAM(S): 15 TABLET, FILM COATED ORAL at 12:18

## 2020-08-18 RX ADMIN — OLANZAPINE 10 MILLIGRAM(S): 15 TABLET, FILM COATED ORAL at 21:42

## 2020-08-18 RX ADMIN — Medication 100 MILLIGRAM(S): at 18:42

## 2020-08-18 RX ADMIN — RIVAROXABAN 20 MILLIGRAM(S): KIT at 17:35

## 2020-08-18 RX ADMIN — Medication 100 MILLIGRAM(S): at 09:59

## 2020-08-18 RX ADMIN — SODIUM CHLORIDE 2000 MILLILITER(S): 9 INJECTION INTRAMUSCULAR; INTRAVENOUS; SUBCUTANEOUS at 08:47

## 2020-08-18 RX ADMIN — Medication 200 MILLIGRAM(S): at 17:35

## 2020-08-18 RX ADMIN — SODIUM CHLORIDE 1000 MILLILITER(S): 9 INJECTION INTRAMUSCULAR; INTRAVENOUS; SUBCUTANEOUS at 07:01

## 2020-08-18 NOTE — ED ADULT NURSE NOTE - OBJECTIVE STATEMENT
Assumed pt care at this time. Pt resting comfortably in stretcher, NAD noted, respirations even and nonlabored. Pt nonverbal with home aide at bedside. BIBA s/t multiple episodes of diarrhea since yesterday. Aide describes stool as watery/brown. Hx of impaction (january 2020). no recent change to diet. normal bladder habits. aide at bedside.

## 2020-08-18 NOTE — ED PROVIDER NOTE - PHYSICAL EXAMINATION
RECTAL: chaperone MD JUSTIN. significant amount of brown watery stool within 3 diapers and covering clothing and bedding. rectum without noted stool impaction.

## 2020-08-18 NOTE — ED PROVIDER NOTE - ATTENDING CONTRIBUTION TO CARE
65 yo well appearing male with history of fecal impaction, C. diff, MR, cerebral palsy presenting with acute and profuse diarrhea starting around 9pm last night. Patient is awake, alert, with significant amount of stool leaking outside of diaper without fecal impaction noted. I personally saw the patient with the PA, and completed the key components of the history and physical exam. I then discussed the management plan with the PA.

## 2020-08-18 NOTE — ED ADULT NURSE REASSESSMENT NOTE - NS ED NURSE REASSESS COMMENT FT1
Pt placed in new, clean diaper. Loose stool noted. Skin intact. Straight catheterization performed and urine sent to lab.

## 2020-08-18 NOTE — CONSULT NOTE ADULT - SUBJECTIVE AND OBJECTIVE BOX
HPI:  63 yo male hx of dvt on xarelto, anemia schizoaffective disorder, prior constipation with fecal impactions, MR/CP, BIBA with home aid from group home with multiple episodes of diarrhea for the past 1 day progressively getting worst. This usually happens when he is impacted. Last impaction: January of this past year.  Admitted elsewhere and Pt was found to have a DVT at that time and has been on Xarelto ever since. As per aid, patient has been  acting normally.  No distress. No recent fevers chills.   Patient has been eating and drinking well at home. No blood in the stool. Patient usually has bowel movements once every other day and is on a bowel regimen of  senna/ lactulose. In ER CT scan shows Generous stool in the rect sigmoid colon, + Stercoral colitis. Suspicion for decreased enhancement in the posterior wall of the rectum as above which raises concern for ischemia. Admitted to medical service.  Now in Isolation in ED pending result of Stool C Diff sample, about to be sent.  No travel or recent antibiotics.  Non-Ambulatory.        PAST MEDICAL & SURGICAL HISTORY:  DVT, lower extremity  Cerebral palsy  Schizoaffective disorder  No significant past surgical history: no known past surgery per care giver      ROS:  No Heartburn, regurgitation, dysphagia, odynophagia.  No dyspepsia  No abdominal pain.    No Nausea, vomiting.  No Bleeding.  No hematemesis.   No diarrhea.    No hematochezia.  No weight loss, anorexia.  No edema.      MEDICATIONS  (STANDING):  ciprofloxacin   IVPB 400 milliGRAM(s) IV Intermittent every 12 hours  melatonin 3 milliGRAM(s) Oral at bedtime  metroNIDAZOLE  IVPB 500 milliGRAM(s) IV Intermittent every 8 hours  OLANZapine 2.5 milliGRAM(s) Oral daily  OLANZapine 10 milliGRAM(s) Oral at bedtime  pantoprazole    Tablet 40 milliGRAM(s) Oral before breakfast  rivaroxaban 20 milliGRAM(s) Oral with dinner  sodium chloride 0.9%. 1000 milliLiter(s) (100 mL/Hr) IV Continuous <Continuous>    MEDICATIONS  (PRN):  temazepam 15 milliGRAM(s) Oral at bedtime PRN Insomnia      Allergies  No Known Allergies          SOCIAL HISTORY:  NC    FAMILY HISTORY:NC  No pertinent family history in first degree relatives: family medical hx is unknown      Vital Signs Last 24 Hrs  T(C): 36.7 (18 Aug 2020 12:13), Max: 36.8 (18 Aug 2020 05:58)  T(F): 98 (18 Aug 2020 12:13), Max: 98.3 (18 Aug 2020 05:58)  HR: 100 (18 Aug 2020 12:13) (95 - 104)  BP: 136/90 (18 Aug 2020 12:13) (135/101 - 142/92)  BP(mean): --  RR: 20 (18 Aug 2020 12:13) (20 - 20)  SpO2: 99% (18 Aug 2020 12:13) (94% - 99%)    PHYSICAL EXAM:    GENERAL: NAD, well-groomed, well-developed; Debilitated.  Moves all extremities.  Mumbles incoherently.    HEAD:  Atraumatic, Normocephalic; + TD. Lip smacking and tongue protrusions.    EYES: EOMI, PERRLA, conjunctiva and sclera clear  ENMT: No tonsillar erythema, exudates, or enlargement; Moist mucous membranes, Good dentition, No lesions  NECK: Supple, No JVD, Normal thyroid  CHEST/LUNG: Clear to percussion bilaterally; No rales, rhonchi, wheezing, or rubs  HEART: Regular rate and rhythm; No murmurs, rubs, or gallops  ABDOMEN: Firm.  Nontender, Nondistended; Bowel sounds present;  No HSM.  No MTR; No scars.    JAVIER:  No lesions. Soft brown stool in diaper.  Not liquidy; No lesions.  Normal prostate.  No hemorrhoids.  No fecal impaction.    EXTREMITIES:  2+ Peripheral Pulses, No clubbing, cyanosis, or edema  LYMPH: No lymphadenopathy noted  SKIN: No rashes or lesions      LABS:                        19.3   10.55 )-----------( 178      ( 18 Aug 2020 06:52 )             56.6     08-18    134<L>  |  99  |  13.0  ----------------------------<  117<H>  4.4   |  19.0<L>  |  0.63    Ca    9.6      18 Aug 2020 06:52  Mg     2.2     08-18    TPro  7.1  /  Alb  4.0  /  TBili  0.8  /  DBili  x   /  AST  19  /  ALT  11  /  AlkPhos  112  08-18       Urinalysis Basic - ( 18 Aug 2020 09:46 )    Color: Yellow / Appearance: Clear / S.015 / pH: x  Gluc: x / Ketone: Negative  / Bili: Negative / Urobili: 4 mg/dL   Blood: x / Protein: 15 mg/dL / Nitrite: Negative   Leuk Esterase: Trace / RBC: 6-10 /HPF / WBC 0-2   Sq Epi: x / Non Sq Epi: Few / Bacteria: Few        LIVER FUNCTIONS - ( 18 Aug 2020 06:52 )  Alb: 4.0 g/dL / Pro: 7.1 g/dL / ALK PHOS: 112 U/L / ALT: 11 U/L / AST: 19 U/L / GGT: x               RADIOLOGY & ADDITIONAL STUDIES:  CT A/P:  CCCO in lungs.  LLL Nodules. HH; GS in non-inflamed GB.  Stercoral colitis. Possible rectal ischemia.  Generous stool in the rectal vault.  No perf or obstruction.

## 2020-08-18 NOTE — ED ADULT NURSE NOTE - NSIMPLEMENTINTERV_GEN_ALL_ED
Implemented All Fall with Harm Risk Interventions:  [place] to call system. Call bell, personal items and telephone within reach. Instruct patient to call for assistance. Room bathroom lighting operational. Non-slip footwear when patient is off stretcher. Physically safe environment: no spills, clutter or unnecessary equipment. Stretcher in lowest position, wheels locked, appropriate side rails in place. Provide visual cue, wrist band, yellow gown, etc. Monitor gait and stability. Monitor for mental status changes and reorient to person, place, and time. Review medications for side effects contributing to fall risk. Reinforce activity limits and safety measures with patient and family. Provide visual clues: red socks.

## 2020-08-18 NOTE — H&P ADULT - NSHPPHYSICALEXAM_GEN_ALL_CORE
PHYSICAL EXAM:  Vital Signs Last 24 Hrs  T(C): 36.4 (18 Aug 2020 08:50), Max: 36.8 (18 Aug 2020 05:58)  T(F): 97.6 (18 Aug 2020 08:50), Max: 98.3 (18 Aug 2020 05:58)  HR: 95 (18 Aug 2020 08:50) (95 - 104)  BP: 142/92 (18 Aug 2020 08:50) (135/101 - 142/92)  BP(mean): --  RR: 20 (18 Aug 2020 08:50) (20 - 20)  SpO2: 95% (18 Aug 2020 08:50) (94% - 95%)    GENERAL: NAD, well-groomed, well-developed  HEAD:  Atraumatic, Normocephalic  EYES: EOMI, PERRLA, conjunctiva and sclera clear  ENMT: No tonsillar erythema, exudates, or enlargement; Moist mucous membranes  NERVOUS SYSTEM: Motor Strength 5/5 B/L upper and lower extremities, sensory intact  CHEST/LUNG: Clear to auscultation bilaterally; No rales, rhonchi, wheezing  HEART: Regular rate and rhythm; No murmurs  ABDOMEN: Soft, Nontender, Nondistended; Bowel sounds present  EXTREMITIES:  2+ Peripheral Pulses, No clubbing, cyanosis, or edema

## 2020-08-18 NOTE — ED PROVIDER NOTE - CLINICAL SUMMARY MEDICAL DECISION MAKING FREE TEXT BOX
63 yo male cp/mr hx of constipation with fecal impaction presenting with aid with diarrhea x 1 day will check labs ct and re-eval 63 yo male cp/mr hx of constipation with fecal impaction presenting with aid with diarrhea x 1 day will check labs ct and re-eval. no impaction on rectal examination

## 2020-08-18 NOTE — H&P ADULT - HISTORY OF PRESENT ILLNESS
65 yo male hx of dvt on xarelto, anemia schizoaffective disorder, constipation with fecal impaction, MR/CP, nonverbal. BIBA with home aid with 1 evening of multiple episodes of diarrhea for the past 1 day progressively getting worst. ually happens when he is impacted. last impaction January of this past year when found to have DVT. As per aid acting normal no distress. no recent fevers chills eating and drinking well at home. no blood in the stool. Usually has bowel movements once every other day and on senna lactulose. In ER CT scan shows Stercoral colitis. Suspicion for decreased enhancement in the posterior wall of the rectum as above which raises concern for ischemia. GI consulted by ED and admitted to medical service.

## 2020-08-18 NOTE — H&P ADULT - ASSESSMENT
63 yo male hx of dvt on xarelto, anemia schizoaffective disorder, constipation with fecal impaction, MR/CP, nonverbal. BIBA with home aid with 1 evening of multiple episodes of diarrhea for the past 1 day progressively getting worst. ually happens when he is impacted. last impaction January of this past year when found to have DVT. As per aid acting normal no distress. no recent fevers chills eating and drinking well at home. no blood in the stool. Usually has bowel movements once every other day and on senna lactulose. In ER CT scan shows Stercoral colitis. Suspicion for decreased enhancement in the posterior wall of the rectum as above which raises concern for ischemia. GI consulted by ED and admitted to medical service.    1) Acute Diarrhea due to Stercoral colitis  - admit to medicine  - hold all laxatives  - continue Cipro and Flagyl  - ? ischemic etiology per CT scan  - GI consulted by ED, waiting for recs  - IVF and clear liquid diet for now    2) DVT  - continue Xarelto    3) Schizoaffective Disorder/anxiety  - continue olanzapine and Temazepam     4) GERD   - on PPI    5) Prophylactic Measure  - DVT ppx: already on Xarelto 63 yo male hx of dvt on xarelto, anemia schizoaffective disorder, constipation with fecal impaction, MR/CP, nonverbal. BIBA with home aid with 1 evening of multiple episodes of diarrhea for the past 1 day progressively getting worst. ually happens when he is impacted. last impaction January of this past year when found to have DVT. As per aid acting normal no distress. no recent fevers chills eating and drinking well at home. no blood in the stool. Usually has bowel movements once every other day and on senna lactulose. In ER CT scan shows Stercoral colitis. Suspicion for decreased enhancement in the posterior wall of the rectum as above which raises concern for ischemia. GI consulted by ED and admitted to medical service.    1) Acute Diarrhea due to Stercoral colitis  - admit to medicine  - hold all laxatives  - continue Cipro and Flagyl  - ? ischemic etiology per CT scan  - GI consulted by ED, waiting for recs  - IVF and clear liquid diet for now    2) Hemoglobin concentration due to dehydration/Diarrhea  - s/p 3L NS bolus  - continue NS @100cc/hr for now    3) DVT  - continue Xarelto    4) Schizoaffective Disorder/anxiety  - continue olanzapine and Temazepam     5) GERD   - on PPI    6) Prophylactic Measure  - DVT ppx: already on Xarelto

## 2020-08-18 NOTE — CONSULT NOTE ADULT - ASSESSMENT
Acute diarrhea in mentally challenged male with hx of fecal impactions.  Clearly stooling, adequately.  No overt diarrhea.  No bleeding.  Benign abdominal exam.   Plan OK for restart of diet.  Stools studies already requested.  Reasonable for Cipro/ Flagyl to cover Stercoral proctitis/ Colitis.  No current plans for EGD or Colonoscopy.  Will follow.

## 2020-08-18 NOTE — ED PROVIDER NOTE - OBJECTIVE STATEMENT
65 yo male hx of dvt on xarelto, anemiam schizoaffective disorder, constipation with fecal impaction, MR/CP, nonverbal. BIBA with home aid with 1 evening of multiple episodes of diarrhea since later yesterday evening first tool formed then consistently getting looser. as per aid this is what usually happens when he is impacted. last impaction january of this past year when found to have DVT. as per aid acting normal no distress. no recent fevers chills eating and drinking well at home. no blood in the stool. usually has bowel movments once every other day. no sick contacts within the household. receiving all medications as directed.   non smoker   family hx unknown 65 yo male hx of dvt on xarelto, anemia schizoaffective disorder, constipation with fecal impaction, MR/CP, nonverbal. BIBA with home aid with 1 evening of multiple episodes of  diarrhea since later yesterday evening. first tool formed then consistently getting looser to now brown and watery as per aid. aid states that this is what usually happens when he is impacted. last impaction January of this past year when found to have DVT. as per aid acting normal no distress. no recent fevers chills eating and drinking well at home. no blood in the stool. usually has bowel movements once every other day. on senna lactulose and ??? no sick contacts within the household. receiving all medications as directed.   non smoker   family hx unknown

## 2020-08-18 NOTE — H&P ADULT - NSICDXFAMILYHX_GEN_ALL_CORE_FT
FAMILY HISTORY:  No pertinent family history in first degree relatives, family medical hx is unknown

## 2020-08-18 NOTE — ED ADULT NURSE REASSESSMENT NOTE - NS ED NURSE REASSESS COMMENT FT1
Assumed pt care @0745 from Ifrah dhillon RN. Pt nonverbal @ baseline with home aide @ bedside. Pt seems to be in no apparent distress @ this time. Breathing even and unlabored.  VSS. Pt safety maintained.

## 2020-08-18 NOTE — H&P ADULT - NSHPLABSRESULTS_GEN_ALL_CORE
19.3   10.55 )-----------( 178      ( 18 Aug 2020 06:52 )             56.6       08-18    134<L>  |  99  |  13.0  ----------------------------<  117<H>  4.4   |  19.0<L>  |  0.63    Ca    9.6      18 Aug 2020 06:52  Mg     2.2     08-18    TPro  7.1  /  Alb  4.0  /  TBili  0.8  /  DBili  x   /  AST  19  /  ALT  11  /  AlkPhos  112  08-18      < from: CT Abdomen and Pelvis w/ IV Cont (08.18.20 @ 08:42) >    FINDINGS:  LOWER CHEST: Motion artifact. Mild groundglass opacities suspected. Cluster of nodules in the left lower lobe and possibly in the right middle lobe. This may be infectious or inflammatory. Please correlate clinically. Hiatal hernia.    LIVER: Within normal limits.  BILE DUCTS: Normal caliber.  GALLBLADDER: Multiple gallstones without gross inflammation  SPLEEN: Within normal limits.  PANCREAS: Within normal limits.  ADRENALS: Within normal limits.  KIDNEYS/URETERS: Small probable right renal cysts and nonobstructing calculi in the right kidney    BLADDER: Within normal limits.  REPRODUCTIVE ORGANS: Uterus poorly visualized. Suspected atrophy.    BOWEL: Large amount of stool in the rectum with distention and wall thickening concerning for stercoral colitis. Focal decrease in enhancement in the inner wall of the rectum seen posteriorly on series 3 image 117 and 118. Ischemia cannot be excluded. Calcification seen in the anterior rectum. Appendix is within normal limits.  PERITONEUM: No ascites.  VESSELS: Within normal limits.  RETROPERITONEUM/LYMPH NODES: No lymphadenopathy.  ABDOMINAL WALL: Within normal limits.  BONES: Degenerative changes of bone. Old fracture and deformity of the left acetabulum. Mild compression in lower thoracic vertebral body.    IMPRESSION:  Stercoral colitis. Suspicion for decreased enhancement in the posterior wall of the rectum as above which raises concern for ischemia. This was discussed with Dr Antonio in the emergency room at 9:30 AM on 8/18/2020.      CARLOS WALL M.D., ATTENDING RADIOLOGIST  This document has been electronically signed. Aug 18 2020  9:33AM    < end of copied text >

## 2020-08-18 NOTE — ED PROVIDER NOTE - PROGRESS NOTE DETAILS
Patient signed out to Dr. Antonio pending labs and CT. Paco WHEAT- pt unable to consent for ct with iv due to his medical conditions, will waive consent, CT study is medically necessary in his case and risk of reaction from contras is minimal as compared to risk of missing any diagnosis Paco WHEAT- pt had recurrent bowel movements in ED, will send c dif smaple, ct showed sterocolitis with possibel ischemia , called GI ocnsult, will contoinue hydration, started flagyl and pt accepted by Dr. Mensah

## 2020-08-18 NOTE — ED PROVIDER NOTE - CARE PLAN
Principal Discharge DX:	Dehydration, moderate  Secondary Diagnosis:	Diarrhea, unspecified type Principal Discharge DX:	Colitis  Secondary Diagnosis:	Diarrhea, unspecified type

## 2020-08-19 DIAGNOSIS — R19.7 DIARRHEA, UNSPECIFIED: ICD-10-CM

## 2020-08-19 LAB
ANION GAP SERPL CALC-SCNC: 11 MMOL/L — SIGNIFICANT CHANGE UP (ref 5–17)
BUN SERPL-MCNC: 15 MG/DL — SIGNIFICANT CHANGE UP (ref 8–20)
CALCIUM SERPL-MCNC: 8.7 MG/DL — SIGNIFICANT CHANGE UP (ref 8.6–10.2)
CHLORIDE SERPL-SCNC: 105 MMOL/L — SIGNIFICANT CHANGE UP (ref 98–107)
CO2 SERPL-SCNC: 21 MMOL/L — LOW (ref 22–29)
CREAT SERPL-MCNC: 0.5 MG/DL — SIGNIFICANT CHANGE UP (ref 0.5–1.3)
CULTURE RESULTS: NO GROWTH — SIGNIFICANT CHANGE UP
GLUCOSE SERPL-MCNC: 117 MG/DL — HIGH (ref 70–99)
HCT VFR BLD CALC: 43.7 % — SIGNIFICANT CHANGE UP (ref 39–50)
HCV AB S/CO SERPL IA: 0.15 S/CO — SIGNIFICANT CHANGE UP (ref 0–0.99)
HCV AB SERPL-IMP: SIGNIFICANT CHANGE UP
HGB BLD-MCNC: 14.3 G/DL — SIGNIFICANT CHANGE UP (ref 13–17)
MAGNESIUM SERPL-MCNC: 1.8 MG/DL — SIGNIFICANT CHANGE UP (ref 1.6–2.6)
MCHC RBC-ENTMCNC: 29.5 PG — SIGNIFICANT CHANGE UP (ref 27–34)
MCHC RBC-ENTMCNC: 32.7 GM/DL — SIGNIFICANT CHANGE UP (ref 32–36)
MCV RBC AUTO: 90.1 FL — SIGNIFICANT CHANGE UP (ref 80–100)
PLATELET # BLD AUTO: 99 K/UL — LOW (ref 150–400)
POTASSIUM SERPL-MCNC: 4 MMOL/L — SIGNIFICANT CHANGE UP (ref 3.5–5.3)
POTASSIUM SERPL-SCNC: 4 MMOL/L — SIGNIFICANT CHANGE UP (ref 3.5–5.3)
RBC # BLD: 4.85 M/UL — SIGNIFICANT CHANGE UP (ref 4.2–5.8)
RBC # FLD: 13.2 % — SIGNIFICANT CHANGE UP (ref 10.3–14.5)
SARS-COV-2 RNA SPEC QL NAA+PROBE: SIGNIFICANT CHANGE UP
SODIUM SERPL-SCNC: 137 MMOL/L — SIGNIFICANT CHANGE UP (ref 135–145)
SPECIMEN SOURCE: SIGNIFICANT CHANGE UP
WBC # BLD: 5.15 K/UL — SIGNIFICANT CHANGE UP (ref 3.8–10.5)
WBC # FLD AUTO: 5.15 K/UL — SIGNIFICANT CHANGE UP (ref 3.8–10.5)

## 2020-08-19 PROCEDURE — 99232 SBSQ HOSP IP/OBS MODERATE 35: CPT

## 2020-08-19 PROCEDURE — 99233 SBSQ HOSP IP/OBS HIGH 50: CPT

## 2020-08-19 RX ORDER — LACTULOSE 10 G/15ML
20 SOLUTION ORAL
Refills: 0 | Status: DISCONTINUED | OUTPATIENT
Start: 2020-08-19 | End: 2020-08-20

## 2020-08-19 RX ORDER — SENNA PLUS 8.6 MG/1
2 TABLET ORAL AT BEDTIME
Refills: 0 | Status: DISCONTINUED | OUTPATIENT
Start: 2020-08-19 | End: 2020-08-25

## 2020-08-19 RX ADMIN — Medication 3 MILLIGRAM(S): at 23:15

## 2020-08-19 RX ADMIN — PANTOPRAZOLE SODIUM 40 MILLIGRAM(S): 20 TABLET, DELAYED RELEASE ORAL at 08:13

## 2020-08-19 RX ADMIN — Medication 200 MILLIGRAM(S): at 17:31

## 2020-08-19 RX ADMIN — Medication 100 MILLIGRAM(S): at 18:31

## 2020-08-19 RX ADMIN — SODIUM CHLORIDE 100 MILLILITER(S): 9 INJECTION INTRAMUSCULAR; INTRAVENOUS; SUBCUTANEOUS at 08:13

## 2020-08-19 RX ADMIN — Medication 100 MILLIGRAM(S): at 02:22

## 2020-08-19 RX ADMIN — TEMAZEPAM 15 MILLIGRAM(S): 15 CAPSULE ORAL at 23:14

## 2020-08-19 RX ADMIN — RIVAROXABAN 20 MILLIGRAM(S): KIT at 17:31

## 2020-08-19 RX ADMIN — Medication 100 MILLIGRAM(S): at 10:43

## 2020-08-19 RX ADMIN — Medication 200 MILLIGRAM(S): at 05:47

## 2020-08-19 RX ADMIN — LACTULOSE 20 GRAM(S): 10 SOLUTION ORAL at 17:31

## 2020-08-19 RX ADMIN — OLANZAPINE 2.5 MILLIGRAM(S): 15 TABLET, FILM COATED ORAL at 12:12

## 2020-08-19 RX ADMIN — SENNA PLUS 2 TABLET(S): 8.6 TABLET ORAL at 23:16

## 2020-08-19 RX ADMIN — OLANZAPINE 10 MILLIGRAM(S): 15 TABLET, FILM COATED ORAL at 23:15

## 2020-08-19 NOTE — PROGRESS NOTE ADULT - SUBJECTIVE AND OBJECTIVE BOX
Pt seen and examined f/u for patient with diarrhea and ct c/w stercoral proctitis    This AM nurses report soft stool per rectum. On my exam today there is a large amount of soft stool felt at the tip of my finger and not in the distal rectum. No loose stool. Stool for C diff is negative.    REVIEW OF SYSTEMS: unable to obtain as patient is nonverbal        MEDICATIONS:  MEDICATIONS  (STANDING):  ciprofloxacin   IVPB 400 milliGRAM(s) IV Intermittent every 12 hours  lactulose Syrup 20 Gram(s) Oral two times a day  melatonin 3 milliGRAM(s) Oral at bedtime  metroNIDAZOLE  IVPB 500 milliGRAM(s) IV Intermittent every 8 hours  OLANZapine 2.5 milliGRAM(s) Oral daily  OLANZapine 10 milliGRAM(s) Oral at bedtime  pantoprazole    Tablet 40 milliGRAM(s) Oral before breakfast  rivaroxaban 20 milliGRAM(s) Oral with dinner  senna 2 Tablet(s) Oral at bedtime  sodium chloride 0.9%. 1000 milliLiter(s) (100 mL/Hr) IV Continuous <Continuous>    MEDICATIONS  (PRN):  temazepam 15 milliGRAM(s) Oral at bedtime PRN Insomnia      Allergies    No Known Allergies    Intolerances        Vital Signs Last 24 Hrs  T(C): 37 (19 Aug 2020 06:51), Max: 37 (19 Aug 2020 06:51)  T(F): 98.6 (19 Aug 2020 06:51), Max: 98.6 (19 Aug 2020 06:51)  HR: 89 (19 Aug 2020 06:51) (89 - 111)  BP: 124/70 (19 Aug 2020 06:51) (124/70 - 142/92)  BP(mean): --  RR: 18 (19 Aug 2020 06:51) (18 - 20)  SpO2: 100% (19 Aug 2020 06:51) (95% - 100%)      PHYSICAL EXAM:    General: Well developed; well nourished; in no acute distress, nonverbal  HEENT: MMM, conjunctiva and sclera clear  Gastrointestinal:Abdomen: Soft non-tender non-distended; Normal bowel sounds; No hepatosplenomegaly  Extremities: no cyanosis, clubbing or edema.  Skin: Warm and dry. No obvious rash  Rectal: large amount of soft stool in mid rectal vault, lite bronw.    LABS:      CBC Full  -  ( 19 Aug 2020 07:45 )  WBC Count : 5.15 K/uL  RBC Count : 4.85 M/uL  Hemoglobin : 14.3 g/dL  Hematocrit : 43.7 %  Platelet Count - Automated : 99 K/uL  Mean Cell Volume : 90.1 fl  Mean Cell Hemoglobin : 29.5 pg  Mean Cell Hemoglobin Concentration : 32.7 gm/dL  Auto Neutrophil # : x  Auto Lymphocyte # : x  Auto Monocyte # : x  Auto Eosinophil # : x  Auto Basophil # : x  Auto Neutrophil % : x  Auto Lymphocyte % : x  Auto Monocyte % : x  Auto Eosinophil % : x  Auto Basophil % : x        137  |  105  |  15.0  ----------------------------<  117<H>  4.0   |  21.0<L>  |  0.50    Ca    8.7      19 Aug 2020 07:45  Mg     1.8         TPro  7.1  /  Alb  4.0  /  TBili  0.8  /  DBili  x   /  AST  19  /  ALT  11  /  AlkPhos  112  -18          Urinalysis Basic - ( 18 Aug 2020 09:46 )    Color: Yellow / Appearance: Clear / S.015 / pH: x  Gluc: x / Ketone: Negative  / Bili: Negative / Urobili: 4 mg/dL   Blood: x / Protein: 15 mg/dL / Nitrite: Negative   Leuk Esterase: Trace / RBC: 6-10 /HPF / WBC 0-2   Sq Epi: x / Non Sq Epi: Few / Bacteria: Few

## 2020-08-19 NOTE — PROGRESS NOTE ADULT - SUBJECTIVE AND OBJECTIVE BOX
ZHANG PATRICK  ----------------------------------------  The patient was seen and evaluated for fecal impaction. Awake and alert but speech is incoherent.    Vital Signs Last 24 Hrs  T(C): 36.6 (19 Aug 2020 08:20), Max: 37 (19 Aug 2020 06:51)  T(F): 97.9 (19 Aug 2020 08:20), Max: 98.6 (19 Aug 2020 06:51)  HR: 73 (19 Aug 2020 08:20) (73 - 111)  BP: 168/77 (19 Aug 2020 08:20) (124/70 - 168/77)  BP(mean): --  RR: 18 (19 Aug 2020 08:20) (18 - 20)  SpO2: 94% (19 Aug 2020 08:20) (94% - 100%)    PHYSICAL EXAMINATION:  ----------------------------------------  General appearance: No acute distress, Awake, Alert  HEENT: Normocephalic, Atraumatic, Conjunctiva clear, EOMI  Neck: Supple, No JVD, No tenderness  Lungs: Breath sound equal bilaterally, No wheezes, No rales  Cardiovascular: S1S2, Regular rhythm  Abdomen: Soft, Nontender, Nondistended, No guarding/rebound, Positive bowel sounds  Extremities: No clubbing, No cyanosis, No edema, No calf tenderness  Neuro: Strength equal bilaterally, No tremors  Psychiatric: Cooperative with examination    LABORATORY STUDIES:  ----------------------------------------             14.3   5.15  )-----------( 99       ( 19 Aug 2020 07:45 )             43.7         137  |  105  |  15.0  ----------------------------<  117<H>  4.0   |  21.0<L>  |  0.50    Ca    8.7      19 Aug 2020 07:45  Mg     1.8         TPro  7.1  /  Alb  4.0  /  TBili  0.8  /  DBili  x   /  AST  19  /  ALT  11  /  AlkPhos  112      LIVER FUNCTIONS - ( 18 Aug 2020 06:52 )  Alb: 4.0 g/dL / Pro: 7.1 g/dL / ALK PHOS: 112 U/L / ALT: 11 U/L / AST: 19 U/L / GGT: x           Urinalysis Basic - ( 18 Aug 2020 09:46 )  Color: Yellow / Appearance: Clear / S.015 / pH: x  Gluc: x / Ketone: Negative  / Bili: Negative / Urobili: 4 mg/dL   Blood: x / Protein: 15 mg/dL / Nitrite: Negative   Leuk Esterase: Trace / RBC: 6-10 /HPF / WBC 0-2   Sq Epi: x / Non Sq Epi: Few / Bacteria: Few    MEDICATIONS  (STANDING):  ciprofloxacin   IVPB 400 milliGRAM(s) IV Intermittent every 12 hours  lactulose Syrup 20 Gram(s) Oral two times a day  melatonin 3 milliGRAM(s) Oral at bedtime  metroNIDAZOLE  IVPB 500 milliGRAM(s) IV Intermittent every 8 hours  OLANZapine 2.5 milliGRAM(s) Oral daily  OLANZapine 10 milliGRAM(s) Oral at bedtime  pantoprazole    Tablet 40 milliGRAM(s) Oral before breakfast  rivaroxaban 20 milliGRAM(s) Oral with dinner  senna 2 Tablet(s) Oral at bedtime    MEDICATIONS  (PRN):  temazepam 15 milliGRAM(s) Oral at bedtime PRN Insomnia      ASSESSMENT / PLAN:  ----------------------------------------  64M with cerebral palsy, schizoaffective disorder, anemia, DVT, and fecal impaction who was referred to the hospital for multiple episodes of diarrhea with CT of the abdomen noting a large amount of stool in the rectum with distention and wall thickening concerning for stercoral colitis.    Colitis - On empiric antibiotics. Gastroenterology consultation noted. On stool softeners. Abdominal examination was benign. For trial of advancing diet.    Polycythemia - Intravenous fluids administered with improvement in blood count but also thrombocytopenia. CBC to be followed.    DVT - On rivaroxaban for anticoagulation.    Schizoaffective disorder - On olanzapine and temazepam.    GERD - On pantoprazole.

## 2020-08-20 DIAGNOSIS — K62.89 OTHER SPECIFIED DISEASES OF ANUS AND RECTUM: ICD-10-CM

## 2020-08-20 DIAGNOSIS — K56.41 FECAL IMPACTION: ICD-10-CM

## 2020-08-20 LAB
CULTURE RESULTS: SIGNIFICANT CHANGE UP
MRSA PCR RESULT.: SIGNIFICANT CHANGE UP
S AUREUS DNA NOSE QL NAA+PROBE: SIGNIFICANT CHANGE UP
SPECIMEN SOURCE: SIGNIFICANT CHANGE UP

## 2020-08-20 PROCEDURE — 99232 SBSQ HOSP IP/OBS MODERATE 35: CPT

## 2020-08-20 PROCEDURE — 99233 SBSQ HOSP IP/OBS HIGH 50: CPT

## 2020-08-20 RX ORDER — SACCHAROMYCES BOULARDII 250 MG
250 POWDER IN PACKET (EA) ORAL
Refills: 0 | Status: DISCONTINUED | OUTPATIENT
Start: 2020-08-20 | End: 2020-08-25

## 2020-08-20 RX ORDER — POLYETHYLENE GLYCOL 3350 17 G/17G
17 POWDER, FOR SOLUTION ORAL
Refills: 0 | Status: DISCONTINUED | OUTPATIENT
Start: 2020-08-20 | End: 2020-08-25

## 2020-08-20 RX ORDER — LACTULOSE 10 G/15ML
30 SOLUTION ORAL THREE TIMES A DAY
Refills: 0 | Status: DISCONTINUED | OUTPATIENT
Start: 2020-08-20 | End: 2020-08-23

## 2020-08-20 RX ADMIN — LACTULOSE 30 GRAM(S): 10 SOLUTION ORAL at 21:26

## 2020-08-20 RX ADMIN — Medication 100 MILLIGRAM(S): at 18:50

## 2020-08-20 RX ADMIN — OLANZAPINE 2.5 MILLIGRAM(S): 15 TABLET, FILM COATED ORAL at 12:02

## 2020-08-20 RX ADMIN — Medication 200 MILLIGRAM(S): at 17:40

## 2020-08-20 RX ADMIN — Medication 250 MILLIGRAM(S): at 17:40

## 2020-08-20 RX ADMIN — Medication 100 MILLIGRAM(S): at 09:55

## 2020-08-20 RX ADMIN — Medication 3 MILLIGRAM(S): at 21:26

## 2020-08-20 RX ADMIN — OLANZAPINE 10 MILLIGRAM(S): 15 TABLET, FILM COATED ORAL at 21:26

## 2020-08-20 RX ADMIN — PANTOPRAZOLE SODIUM 40 MILLIGRAM(S): 20 TABLET, DELAYED RELEASE ORAL at 05:25

## 2020-08-20 RX ADMIN — Medication 200 MILLIGRAM(S): at 05:24

## 2020-08-20 RX ADMIN — SENNA PLUS 2 TABLET(S): 8.6 TABLET ORAL at 21:26

## 2020-08-20 RX ADMIN — LACTULOSE 30 GRAM(S): 10 SOLUTION ORAL at 14:17

## 2020-08-20 RX ADMIN — POLYETHYLENE GLYCOL 3350 17 GRAM(S): 17 POWDER, FOR SOLUTION ORAL at 17:40

## 2020-08-20 RX ADMIN — Medication 100 MILLIGRAM(S): at 02:08

## 2020-08-20 RX ADMIN — RIVAROXABAN 20 MILLIGRAM(S): KIT at 17:40

## 2020-08-20 RX ADMIN — LACTULOSE 20 GRAM(S): 10 SOLUTION ORAL at 05:25

## 2020-08-20 NOTE — PHYSICAL THERAPY INITIAL EVALUATION ADULT - RANGE OF MOTION EXAMINATION, REHAB EVAL
poor command following unable to fully assess, appears to have zechariah knee extension contractures

## 2020-08-20 NOTE — PHYSICAL THERAPY INITIAL EVALUATION ADULT - ADDITIONAL COMMENTS
pt a poor historian, as per SW-pt w/c bound, requires assist, unsure if mik lift is used, lives at a group home

## 2020-08-20 NOTE — PHYSICAL THERAPY INITIAL EVALUATION ADULT - BED MOBILITY LIMITATIONS, REHAB EVAL
poor command following, zechariah knee extension contractures, poor sitting balance, unsafe to ambulate at this time

## 2020-08-20 NOTE — PROGRESS NOTE ADULT - SUBJECTIVE AND OBJECTIVE BOX
Pt seen and examined. According to his nurse he had no BM's yesterday and has had none so far today.     REVIEW OF SYSTEMS:  Unobtainable in this pt.      MEDICATIONS:  MEDICATIONS  (STANDING):  ciprofloxacin   IVPB 400 milliGRAM(s) IV Intermittent every 12 hours  lactulose Syrup 30 Gram(s) Oral three times a day  melatonin 3 milliGRAM(s) Oral at bedtime  metroNIDAZOLE  IVPB 500 milliGRAM(s) IV Intermittent every 8 hours  OLANZapine 2.5 milliGRAM(s) Oral daily  OLANZapine 10 milliGRAM(s) Oral at bedtime  pantoprazole    Tablet 40 milliGRAM(s) Oral before breakfast  polyethylene glycol 3350 17 Gram(s) Oral two times a day  rivaroxaban 20 milliGRAM(s) Oral with dinner  saccharomyces boulardii 250 milliGRAM(s) Oral two times a day  senna 2 Tablet(s) Oral at bedtime    MEDICATIONS  (PRN):  temazepam 15 milliGRAM(s) Oral at bedtime PRN Insomnia      Allergies    No Known Allergies    Intolerances        Vital Signs Last 24 Hrs  T(C): 36.7 (19 Aug 2020 23:31), Max: 36.7 (19 Aug 2020 17:54)  T(F): 98 (19 Aug 2020 23:31), Max: 98 (19 Aug 2020 17:54)  HR: 85 (19 Aug 2020 23:31) (85 - 99)  BP: 142/60 (19 Aug 2020 23:31) (142/60 - 145/85)  BP(mean): --  RR: 19 (19 Aug 2020 23:31) (18 - 19)  SpO2: 97% (19 Aug 2020 23:31) (96% - 97%)    08-19 @ 07:01  -  08-20 @ 07:00  --------------------------------------------------------  IN: 240 mL / OUT: 0 mL / NET: 240 mL        PHYSICAL EXAM:  Abdomen: Soft, NT, ND no HSM.  JAVIER: uncooperative for exam.    LABS:  CBC Full  -  ( 19 Aug 2020 07:45 )  WBC Count : 5.15 K/uL  RBC Count : 4.85 M/uL  Hemoglobin : 14.3 g/dL  Hematocrit : 43.7 %  Platelet Count - Automated : 99 K/uL  Mean Cell Volume : 90.1 fl  Mean Cell Hemoglobin : 29.5 pg  Mean Cell Hemoglobin Concentration : 32.7 gm/dL  Auto Neutrophil # : x  Auto Lymphocyte # : x  Auto Monocyte # : x  Auto Eosinophil # : x  Auto Basophil # : x  Auto Neutrophil % : x  Auto Lymphocyte % : x  Auto Monocyte % : x  Auto Eosinophil % : x  Auto Basophil % : x    08-19    137  |  105  |  15.0  ----------------------------<  117<H>  4.0   |  21.0<L>  |  0.50    Ca    8.7      19 Aug 2020 07:45  Mg     1.8     08-19                        RADIOLOGY & ADDITIONAL STUDIES (The following images were personally reviewed):

## 2020-08-20 NOTE — PHYSICAL THERAPY INITIAL EVALUATION ADULT - DISCHARGE DISPOSITION, PT EVAL
return back to group home, assist for all mobility, may require mik lift if group home does not own one already

## 2020-08-20 NOTE — PROGRESS NOTE ADULT - SUBJECTIVE AND OBJECTIVE BOX
ZHANG PATRICK  ----------------------------------------  The patient was seen and evaluated for colitis. Awake and alert but speech difficult to comprehend.    Vital Signs Last 24 Hrs  T(C): 36.7 (19 Aug 2020 23:31), Max: 36.7 (19 Aug 2020 17:54)  T(F): 98 (19 Aug 2020 23:31), Max: 98 (19 Aug 2020 17:54)  HR: 85 (19 Aug 2020 23:31) (85 - 99)  BP: 142/60 (19 Aug 2020 23:31) (142/60 - 145/85)  BP(mean): --  RR: 19 (19 Aug 2020 23:31) (18 - 19)  SpO2: 97% (19 Aug 2020 23:31) (96% - 97%)    PHYSICAL EXAMINATION:  ----------------------------------------  General appearance: No acute distress, Awake, Alert  HEENT: Normocephalic, Atraumatic, Conjunctiva clear, EOMI  Neck: Supple, No JVD, No tenderness  Lungs: Breath sound equal bilaterally, No wheezes, No rales  Cardiovascular: S1S2, Regular rhythm  Abdomen: Soft, Nontender, Nondistended, No guarding/rebound, Positive bowel sounds  Extremities: No clubbing, No cyanosis, No edema, No calf tenderness  Neuro: Strength equal bilaterally, No tremors  Psychiatric: Cooperative with examination    LABORATORY STUDIES:  ----------------------------------------             14.3   5.15  )-----------( 99       ( 19 Aug 2020 07:45 )             43.7     08-19    137  |  105  |  15.0  ----------------------------<  117<H>  4.0   |  21.0<L>  |  0.50    Ca    8.7      19 Aug 2020 07:45  Mg     1.8     08-19    Culture - Stool (collected 18 Aug 2020 22:01)  Source: .Stool Feces  Preliminary Report (19 Aug 2020 19:30):    No enteric pathogens to date: Final culture pending    Culture - Urine (collected 18 Aug 2020 16:45)  Source: .Urine Clean Catch (Midstream)  Final Report (19 Aug 2020 12:07):    No growth    MEDICATIONS  (STANDING):  ciprofloxacin   IVPB 400 milliGRAM(s) IV Intermittent every 12 hours  lactulose Syrup 20 Gram(s) Oral two times a day  melatonin 3 milliGRAM(s) Oral at bedtime  metroNIDAZOLE  IVPB 500 milliGRAM(s) IV Intermittent every 8 hours  OLANZapine 2.5 milliGRAM(s) Oral daily  OLANZapine 10 milliGRAM(s) Oral at bedtime  pantoprazole    Tablet 40 milliGRAM(s) Oral before breakfast  rivaroxaban 20 milliGRAM(s) Oral with dinner  saccharomyces boulardii 250 milliGRAM(s) Oral two times a day  senna 2 Tablet(s) Oral at bedtime    MEDICATIONS  (PRN):  temazepam 15 milliGRAM(s) Oral at bedtime PRN Insomnia      ASSESSMENT / PLAN:  ----------------------------------------  64M with cerebral palsy, schizoaffective disorder, anemia, DVT, and fecal impaction who was referred to the hospital for multiple episodes of diarrhea with CT of the abdomen noting a large amount of stool in the rectum with distention and wall thickening concerning for stercoral colitis.    Colitis - On empiric antibiotics. On stool softeners. Diet advanced yesterday. Abdominal examination was benign. Monitoring for bowel movements.    Polycythemia - Intravenous fluids administered with improvement in blood count but also thrombocytopenia. CBC results to be followed.    DVT - On rivaroxaban for anticoagulation.    Schizoaffective disorder - On olanzapine and temazepam.    GERD - On pantoprazole.

## 2020-08-21 LAB
HCT VFR BLD CALC: 44.6 % — SIGNIFICANT CHANGE UP (ref 39–50)
HGB BLD-MCNC: 14.9 G/DL — SIGNIFICANT CHANGE UP (ref 13–17)
MCHC RBC-ENTMCNC: 29.1 PG — SIGNIFICANT CHANGE UP (ref 27–34)
MCHC RBC-ENTMCNC: 33.4 GM/DL — SIGNIFICANT CHANGE UP (ref 32–36)
MCV RBC AUTO: 87.1 FL — SIGNIFICANT CHANGE UP (ref 80–100)
PLATELET # BLD AUTO: 117 K/UL — LOW (ref 150–400)
RBC # BLD: 5.12 M/UL — SIGNIFICANT CHANGE UP (ref 4.2–5.8)
RBC # FLD: 12.9 % — SIGNIFICANT CHANGE UP (ref 10.3–14.5)
WBC # BLD: 3.3 K/UL — LOW (ref 3.8–10.5)
WBC # FLD AUTO: 3.3 K/UL — LOW (ref 3.8–10.5)

## 2020-08-21 PROCEDURE — 99232 SBSQ HOSP IP/OBS MODERATE 35: CPT

## 2020-08-21 PROCEDURE — 99233 SBSQ HOSP IP/OBS HIGH 50: CPT

## 2020-08-21 RX ORDER — MINERAL OIL
133 OIL (ML) MISCELLANEOUS EVERY 12 HOURS
Refills: 0 | Status: COMPLETED | OUTPATIENT
Start: 2020-08-21 | End: 2020-08-23

## 2020-08-21 RX ORDER — MINERAL OIL
133 OIL (ML) MISCELLANEOUS ONCE
Refills: 0 | Status: COMPLETED | OUTPATIENT
Start: 2020-08-21 | End: 2020-08-21

## 2020-08-21 RX ADMIN — Medication 250 MILLIGRAM(S): at 17:06

## 2020-08-21 RX ADMIN — LACTULOSE 30 GRAM(S): 10 SOLUTION ORAL at 22:35

## 2020-08-21 RX ADMIN — RIVAROXABAN 20 MILLIGRAM(S): KIT at 17:06

## 2020-08-21 RX ADMIN — SENNA PLUS 2 TABLET(S): 8.6 TABLET ORAL at 22:34

## 2020-08-21 RX ADMIN — Medication 200 MILLIGRAM(S): at 17:06

## 2020-08-21 RX ADMIN — Medication 133 MILLILITER(S): at 10:13

## 2020-08-21 RX ADMIN — Medication 100 MILLIGRAM(S): at 02:55

## 2020-08-21 RX ADMIN — Medication 100 MILLIGRAM(S): at 17:06

## 2020-08-21 RX ADMIN — LACTULOSE 30 GRAM(S): 10 SOLUTION ORAL at 12:52

## 2020-08-21 RX ADMIN — LACTULOSE 30 GRAM(S): 10 SOLUTION ORAL at 05:02

## 2020-08-21 RX ADMIN — OLANZAPINE 2.5 MILLIGRAM(S): 15 TABLET, FILM COATED ORAL at 12:52

## 2020-08-21 RX ADMIN — POLYETHYLENE GLYCOL 3350 17 GRAM(S): 17 POWDER, FOR SOLUTION ORAL at 17:06

## 2020-08-21 RX ADMIN — Medication 133 MILLILITER(S): at 22:36

## 2020-08-21 RX ADMIN — Medication 3 MILLIGRAM(S): at 22:35

## 2020-08-21 RX ADMIN — Medication 200 MILLIGRAM(S): at 05:02

## 2020-08-21 RX ADMIN — Medication 100 MILLIGRAM(S): at 09:02

## 2020-08-21 RX ADMIN — POLYETHYLENE GLYCOL 3350 17 GRAM(S): 17 POWDER, FOR SOLUTION ORAL at 05:02

## 2020-08-21 RX ADMIN — OLANZAPINE 10 MILLIGRAM(S): 15 TABLET, FILM COATED ORAL at 22:34

## 2020-08-21 NOTE — PROGRESS NOTE ADULT - SUBJECTIVE AND OBJECTIVE BOX
ZHANG PATRICK  ----------------------------------------  The patient was seen and evaluated for fecal impaction. Appears comfortable.    Vital Signs Last 24 Hrs  T(C): 36.8 (21 Aug 2020 08:47), Max: 36.8 (21 Aug 2020 08:47)  T(F): 98.2 (21 Aug 2020 08:47), Max: 98.2 (21 Aug 2020 08:47)  HR: 90 (21 Aug 2020 08:47) (73 - 90)  BP: 145/91 (21 Aug 2020 08:47) (145/91 - 155/89)  BP(mean): --  RR: 19 (21 Aug 2020 08:47) (18 - 19)  SpO2: 97% (21 Aug 2020 08:47) (95% - 97%)    PHYSICAL EXAMINATION:  ----------------------------------------  General appearance: No acute distress, Awake, Alert  HEENT: Normocephalic, Atraumatic, Conjunctiva clear, EOMI  Neck: Supple, No JVD, No tenderness  Lungs: Breath sound equal bilaterally, No wheezes, No rales  Cardiovascular: S1S2, Regular rhythm  Abdomen: Soft, Nontender, Nondistended, No guarding/rebound, Positive bowel sounds  Extremities: No clubbing, No cyanosis, No edema, No calf tenderness  Neuro: Strength equal bilaterally, No tremors  Psychiatric: Cooperative with examination    LABORATORY STUDIES:  ----------------------------------------             14.9   3.30  )-----------( 117      ( 21 Aug 2020 06:27 )             44.6     Culture - Stool (collected 18 Aug 2020 22:01)  Source: .Stool Feces  Final Report (20 Aug 2020 21:43):    No enteric pathogens isolated.    (Stool culture examined for Salmonella,    Shigella, Campylobacter, Aeromonas, Plesiomonas,    Vibrio, E.coli O157 and Yersinia)    Culture - Urine (collected 18 Aug 2020 16:45)  Source: .Urine Clean Catch (Midstream)  Final Report (19 Aug 2020 12:07):    No growth    MEDICATIONS  (STANDING):  ciprofloxacin   IVPB 400 milliGRAM(s) IV Intermittent every 12 hours  lactulose Syrup 30 Gram(s) Oral three times a day  melatonin 3 milliGRAM(s) Oral at bedtime  metroNIDAZOLE  IVPB 500 milliGRAM(s) IV Intermittent every 8 hours  OLANZapine 10 milliGRAM(s) Oral at bedtime  OLANZapine 2.5 milliGRAM(s) Oral daily  pantoprazole    Tablet 40 milliGRAM(s) Oral before breakfast  polyethylene glycol 3350 17 Gram(s) Oral two times a day  rivaroxaban 20 milliGRAM(s) Oral with dinner  saccharomyces boulardii 250 milliGRAM(s) Oral two times a day  senna 2 Tablet(s) Oral at bedtime    MEDICATIONS  (PRN):  temazepam 15 milliGRAM(s) Oral at bedtime PRN Insomnia      ASSESSMENT / PLAN:  ----------------------------------------  64M with cerebral palsy, schizoaffective disorder, anemia, DVT, and fecal impaction who was referred to the hospital for multiple episodes of diarrhea with CT of the abdomen noting a large amount of stool in the rectum with distention and wall thickening concerning for stercoral colitis.    Colitis - On empiric antibiotics. Afebrile and without leukocytosis. On stool softeners but without bowel movement. Abdominal examination was benign. For enema.    Polycythemia - Blood count improved with intravenous fluids.    DVT - On rivaroxaban for anticoagulation.    Schizoaffective disorder - On olanzapine and temazepam.    GERD - On pantoprazole.

## 2020-08-21 NOTE — PROGRESS NOTE ADULT - SUBJECTIVE AND OBJECTIVE BOX
Chief Complaint: This is a 64y old man patient being seen in follow-up consultation for fecal impaction.    HPI / 24H events:  Patient seeing and evaluated at bedside, comfortable, as per nurse reports no BM this am despite of Enema.  Patient tolerating Dysphagia/Pureed diet, as per staff no nausea, vomiting, diarrhea, shortness of breath.                                                .    ROS: Unable to obtain.    PAST MEDICAL/SURGICAL HISTORY:  DVT, lower extremity  Cerebral palsy  Schizoaffective disorder  No significant past surgical history: no known past surgeires per care giver    MEDICATIONS  (STANDING):  ciprofloxacin   IVPB 400 milliGRAM(s) IV Intermittent every 12 hours  lactulose Syrup 30 Gram(s) Oral three times a day  melatonin 3 milliGRAM(s) Oral at bedtime  metroNIDAZOLE  IVPB 500 milliGRAM(s) IV Intermittent every 8 hours  OLANZapine 10 milliGRAM(s) Oral at bedtime  OLANZapine 2.5 milliGRAM(s) Oral daily  pantoprazole    Tablet 40 milliGRAM(s) Oral before breakfast  polyethylene glycol 3350 17 Gram(s) Oral two times a day  rivaroxaban 20 milliGRAM(s) Oral with dinner  saccharomyces boulardii 250 milliGRAM(s) Oral two times a day  senna 2 Tablet(s) Oral at bedtime    MEDICATIONS  (PRN):  temazepam 15 milliGRAM(s) Oral at bedtime PRN Insomnia    No Known Allergies    T(C): 36.8 (08-21-20 @ 08:47), Max: 36.8 (08-21-20 @ 08:47)  HR: 90 (08-21-20 @ 08:47) (73 - 90)  BP: 145/91 (08-21-20 @ 08:47) (145/91 - 155/89)  RR: 19 (08-21-20 @ 08:47) (18 - 19)  SpO2: 97% (08-21-20 @ 08:47) (95% - 97%)    I&O's Summary    PHYSICAL EXAM:  Constitutional: Comfortable in no apparent distress  Eyes: Sclerae anicteric, conjunctivae normal  Respiratory: Breathing nonlabored; clear to auscultation  Cardiovascular: Regular rate and rhythm  Gastrointestinal: Soft, nontender, nondistended, normoactive bowel sounds; no hepatosplenomegaly appreciated; no rebound tenderness or involuntary guarding  Extremities: No clubbing, cyanosis or edema  Neurological: Alert and oriented to person, place and time; no asterixis  Skin: No jaundice  Musculoskeletal: No significant peripheral atrophy  Psychiatric: Cooperative                    14.9   3.30  )-----------( 117      ( 08-21 @ 06:27 )             44.6                14.3   5.15  )-----------( 99       ( 08-19 @ 07:45 )             43.7         Lipase, Serum: 13 U/L (08-18-20 @ 06:52)        Culture - Stool (collected 18 Aug 2020 22:01)  Source: .Stool Feces  Final Report (20 Aug 2020 21:43):    No enteric pathogens isolated.    (Stool culture examined for Salmonella,    Shigella, Campylobacter, Aeromonas, Plesiomonas,    Vibrio, E.coli O157 and Yersinia)    Culture - Urine (collected 18 Aug 2020 16:45)  Source: .Urine Clean Catch (Midstream)  Final Report (19 Aug 2020 12:07):    No growth      IMAGING: I personally reviewed the CT of abdomen and pelvis, and I agree with the radiologist's interpretation as described below:    FINDINGS:  LOWER CHEST: Motion artifact. Mild groundglass opacities suspected. Cluster of nodules in the left lower lobe and possibly in the right middle lobe. This may be infectious or inflammatory. Please correlate clinically. Hiatal hernia.    LIVER: Within normal limits.  BILE DUCTS: Normal caliber.  GALLBLADDER: Multiple gallstones without gross inflammation  SPLEEN: Within normal limits.  PANCREAS: Within normal limits.  ADRENALS: Within normal limits.  KIDNEYS/URETERS: Small probable right renal cysts and nonobstructing calculi in the right kidney    BLADDER: Within normal limits.  REPRODUCTIVE ORGANS: Uterus poorly visualized. Suspected atrophy.    BOWEL: Large amount of stool in the rectum with distention and wall thickening concerning for stercoral colitis. Focal decrease in enhancement in the inner wall of the rectum seen posteriorly on series 3 image 117 and 118. Ischemia cannot be excluded. Calcification seen in the anterior rectum. Appendix is within normal limits.  PERITONEUM: No ascites.  VESSELS: Within normal limits.  RETROPERITONEUM/LYMPH NODES: No lymphadenopathy.  ABDOMINAL WALL: Within normal limits.  BONES: Degenerative changes of bone. Old fracture and deformity of the left acetabulum. Mild compression in lower thoracic vertebral body.    IMPRESSION:  Stercoral colitis. Suspicion for decreased enhancement in the posterior wall of the rectum as above which raises concern for ischemia. This was discussed with Dr Antonio in the emergency room at 9:30 AM on 8/18/2020.

## 2020-08-22 LAB
ANION GAP SERPL CALC-SCNC: 13 MMOL/L — SIGNIFICANT CHANGE UP (ref 5–17)
BUN SERPL-MCNC: 8 MG/DL — SIGNIFICANT CHANGE UP (ref 8–20)
CALCIUM SERPL-MCNC: 9.3 MG/DL — SIGNIFICANT CHANGE UP (ref 8.6–10.2)
CHLORIDE SERPL-SCNC: 108 MMOL/L — HIGH (ref 98–107)
CO2 SERPL-SCNC: 24 MMOL/L — SIGNIFICANT CHANGE UP (ref 22–29)
CREAT SERPL-MCNC: 0.51 MG/DL — SIGNIFICANT CHANGE UP (ref 0.5–1.3)
GLUCOSE SERPL-MCNC: 80 MG/DL — SIGNIFICANT CHANGE UP (ref 70–99)
HCT VFR BLD CALC: 41.5 % — SIGNIFICANT CHANGE UP (ref 39–50)
HGB BLD-MCNC: 13.9 G/DL — SIGNIFICANT CHANGE UP (ref 13–17)
MCHC RBC-ENTMCNC: 29.5 PG — SIGNIFICANT CHANGE UP (ref 27–34)
MCHC RBC-ENTMCNC: 33.5 GM/DL — SIGNIFICANT CHANGE UP (ref 32–36)
MCV RBC AUTO: 88.1 FL — SIGNIFICANT CHANGE UP (ref 80–100)
PLATELET # BLD AUTO: 117 K/UL — LOW (ref 150–400)
POTASSIUM SERPL-MCNC: 3.1 MMOL/L — LOW (ref 3.5–5.3)
POTASSIUM SERPL-SCNC: 3.1 MMOL/L — LOW (ref 3.5–5.3)
RBC # BLD: 4.71 M/UL — SIGNIFICANT CHANGE UP (ref 4.2–5.8)
RBC # FLD: 13.2 % — SIGNIFICANT CHANGE UP (ref 10.3–14.5)
SODIUM SERPL-SCNC: 145 MMOL/L — SIGNIFICANT CHANGE UP (ref 135–145)
WBC # BLD: 2.94 K/UL — LOW (ref 3.8–10.5)
WBC # FLD AUTO: 2.94 K/UL — LOW (ref 3.8–10.5)

## 2020-08-22 PROCEDURE — 99233 SBSQ HOSP IP/OBS HIGH 50: CPT

## 2020-08-22 RX ORDER — POTASSIUM CHLORIDE 20 MEQ
40 PACKET (EA) ORAL EVERY 4 HOURS
Refills: 0 | Status: COMPLETED | OUTPATIENT
Start: 2020-08-22 | End: 2020-08-22

## 2020-08-22 RX ADMIN — Medication 133 MILLILITER(S): at 10:51

## 2020-08-22 RX ADMIN — OLANZAPINE 10 MILLIGRAM(S): 15 TABLET, FILM COATED ORAL at 22:26

## 2020-08-22 RX ADMIN — OLANZAPINE 2.5 MILLIGRAM(S): 15 TABLET, FILM COATED ORAL at 12:22

## 2020-08-22 RX ADMIN — Medication 40 MILLIEQUIVALENT(S): at 10:23

## 2020-08-22 RX ADMIN — Medication 200 MILLIGRAM(S): at 06:04

## 2020-08-22 RX ADMIN — Medication 250 MILLIGRAM(S): at 09:06

## 2020-08-22 RX ADMIN — Medication 200 MILLIGRAM(S): at 17:21

## 2020-08-22 RX ADMIN — Medication 100 MILLIGRAM(S): at 01:08

## 2020-08-22 RX ADMIN — SENNA PLUS 2 TABLET(S): 8.6 TABLET ORAL at 22:26

## 2020-08-22 RX ADMIN — LACTULOSE 30 GRAM(S): 10 SOLUTION ORAL at 14:07

## 2020-08-22 RX ADMIN — RIVAROXABAN 20 MILLIGRAM(S): KIT at 17:20

## 2020-08-22 RX ADMIN — POLYETHYLENE GLYCOL 3350 17 GRAM(S): 17 POWDER, FOR SOLUTION ORAL at 17:23

## 2020-08-22 RX ADMIN — Medication 100 MILLIGRAM(S): at 18:34

## 2020-08-22 RX ADMIN — Medication 3 MILLIGRAM(S): at 22:26

## 2020-08-22 RX ADMIN — POLYETHYLENE GLYCOL 3350 17 GRAM(S): 17 POWDER, FOR SOLUTION ORAL at 09:06

## 2020-08-22 RX ADMIN — Medication 133 MILLILITER(S): at 17:23

## 2020-08-22 RX ADMIN — Medication 250 MILLIGRAM(S): at 17:20

## 2020-08-22 RX ADMIN — LACTULOSE 30 GRAM(S): 10 SOLUTION ORAL at 09:07

## 2020-08-22 RX ADMIN — Medication 100 MILLIGRAM(S): at 09:07

## 2020-08-22 RX ADMIN — LACTULOSE 30 GRAM(S): 10 SOLUTION ORAL at 22:26

## 2020-08-22 RX ADMIN — Medication 40 MILLIEQUIVALENT(S): at 14:08

## 2020-08-22 RX ADMIN — TEMAZEPAM 15 MILLIGRAM(S): 15 CAPSULE ORAL at 01:09

## 2020-08-22 NOTE — PROGRESS NOTE ADULT - SUBJECTIVE AND OBJECTIVE BOX
CC: Schizoaffective with slow transit related to psych med "psych gut".   HPI:  65 yo male hx of dvt on xarelto, anemia schizoaffective disorder, constipation with fecal impaction, MR/CP, nonverbal. BIBA with home aid with 1 evening of multiple episodes of diarrhea for the past 1 day progressively getting worst. ually happens when he is impacted. last impaction January of this past year when found to have DVT. As per aid acting normal no distress. no recent fevers chills eating and drinking well at home. no blood in the stool. Usually has bowel movements once every other day and on senna lactulose. In ER CT scan shows Stercoral colitis. Suspicion for decreased enhancement in the posterior wall of the rectum as above which raises concern for ischemia. GI consulted by ED and admitted to medical service. (18 Aug 2020 10:53)    REVIEW OF SYSTEMS:    Patient denied fever, chills, abdominal pain, nausea, vomiting, cough, shortness of breath, chest pain or palpitations    Vital Signs Last 24 Hrs  T(C): 36.9 (22 Aug 2020 00:31), Max: 36.9 (22 Aug 2020 00:31)  T(F): 98.4 (22 Aug 2020 00:31), Max: 98.4 (22 Aug 2020 00:31)  HR: 68 (22 Aug 2020 05:10) (68 - 100)  BP: 103/71 (22 Aug 2020 05:10) (93/65 - 133/79)  BP(mean): --  RR: 19 (22 Aug 2020 00:31) (19 - 20)  SpO2: 94% (22 Aug 2020 00:31) (94% - 96%)I&O's Summary    PHYSICAL EXAM:  GENERAL: NAD,   HEENT: PERRL, +EOMI, anicteric, no [place]  NECK: Supple, No JVD   CHEST/LUNG: CTA bilaterally; Normal effort  HEART: S1S2 Normal intensity, no murmurs, gallops or rubs noted  ABDOMEN: Soft, BS Normoactive, NT, ND, no HSM noted  EXTREMITIES:  2+ radial and DP pulses noted, no clubbing, cyanosis, or edema noted, Limited mobility   SKIN: No rashes or lesions noted  NEURO: A&Ox3, no focal deficits noted, CN II-XII intact  PSYCH: Depressed mood and affect; insight/judgement appropriate  LABS:                        13.9   2.94  )-----------( 117      ( 22 Aug 2020 08:46 )             41.5     08-22    145  |  108<H>  |  8.0  ----------------------------<  80  3.1<L>   |  24.0  |  0.51    Ca    9.3      22 Aug 2020 08:46          RADIOLOGY & ADDITIONAL TESTS:    MEDICATIONS:  MEDICATIONS  (STANDING):  ciprofloxacin   IVPB 400 milliGRAM(s) IV Intermittent every 12 hours  lactulose Syrup 30 Gram(s) Oral three times a day  melatonin 3 milliGRAM(s) Oral at bedtime  metroNIDAZOLE  IVPB 500 milliGRAM(s) IV Intermittent every 8 hours  mineral oil enema 133 milliLiter(s) Rectal every 12 hours  OLANZapine 2.5 milliGRAM(s) Oral daily  OLANZapine 10 milliGRAM(s) Oral at bedtime  pantoprazole    Tablet 40 milliGRAM(s) Oral before breakfast  polyethylene glycol 3350 17 Gram(s) Oral two times a day  rivaroxaban 20 milliGRAM(s) Oral with dinner  saccharomyces boulardii 250 milliGRAM(s) Oral two times a day  senna 2 Tablet(s) Oral at bedtime    MEDICATIONS  (PRN):  temazepam 15 milliGRAM(s) Oral at bedtime PRN Insomnia CC: Schizoaffective with slow transit constipation related to psych med "psych gut".   HPI:  65 yo male hx of dvt on xarelto, anemia schizoaffective disorder, constipation with fecal impaction, MR/CP, nonverbal. BIBA with home aid with 1 evening of multiple episodes of diarrhea for the past 1 day progressively getting worst. ually happens when he is impacted. last impaction January of this past year when found to have DVT. As per aid acting normal no distress. no recent fevers chills eating and drinking well at home. no blood in the stool. Usually has bowel movements once every other day and on senna lactulose. In ER CT scan shows Stercoral colitis. Suspicion for decreased enhancement in the posterior wall of the rectum as above which raises concern for ischemia. GI consulted by ED and admitted to medical service. (18 Aug 2020 10:53)    REVIEW OF SYSTEMS:    Patient denied fever, chills, abdominal pain, nausea, vomiting, cough, shortness of breath, chest pain or palpitations    Vital Signs Last 24 Hrs  T(C): 36.9 (22 Aug 2020 00:31), Max: 36.9 (22 Aug 2020 00:31)  T(F): 98.4 (22 Aug 2020 00:31), Max: 98.4 (22 Aug 2020 00:31)  HR: 68 (22 Aug 2020 05:10) (68 - 100)  BP: 103/71 (22 Aug 2020 05:10) (93/65 - 133/79)  BP(mean): --  RR: 19 (22 Aug 2020 00:31) (19 - 20)  SpO2: 94% (22 Aug 2020 00:31) (94% - 96%)I&O's Summary    PHYSICAL EXAM:  GENERAL: NAD,   HEENT: PERRL, +EOMI, anicteric, no [place]  NECK: Supple, No JVD   CHEST/LUNG: CTA bilaterally; Normal effort  HEART: S1S2 Normal intensity, no murmurs, gallops or rubs noted  ABDOMEN: Soft, BS Normoactive, NT, ND, no HSM noted  EXTREMITIES:  2+ radial and DP pulses noted, no clubbing, cyanosis, or edema noted, Limited mobility   SKIN: No rashes or lesions noted  NEURO: A&Ox3, no focal deficits noted, CN II-XII intact  PSYCH: Depressed mood and affect; insight/judgement appropriate  LABS:                        13.9   2.94  )-----------( 117      ( 22 Aug 2020 08:46 )             41.5     08-22    145  |  108<H>  |  8.0  ----------------------------<  80  3.1<L>   |  24.0  |  0.51    Ca    9.3      22 Aug 2020 08:46          RADIOLOGY & ADDITIONAL TESTS:    MEDICATIONS:  MEDICATIONS  (STANDING):  ciprofloxacin   IVPB 400 milliGRAM(s) IV Intermittent every 12 hours  lactulose Syrup 30 Gram(s) Oral three times a day  melatonin 3 milliGRAM(s) Oral at bedtime  metroNIDAZOLE  IVPB 500 milliGRAM(s) IV Intermittent every 8 hours  mineral oil enema 133 milliLiter(s) Rectal every 12 hours  OLANZapine 2.5 milliGRAM(s) Oral daily  OLANZapine 10 milliGRAM(s) Oral at bedtime  pantoprazole    Tablet 40 milliGRAM(s) Oral before breakfast  polyethylene glycol 3350 17 Gram(s) Oral two times a day  rivaroxaban 20 milliGRAM(s) Oral with dinner  saccharomyces boulardii 250 milliGRAM(s) Oral two times a day  senna 2 Tablet(s) Oral at bedtime    MEDICATIONS  (PRN):  temazepam 15 milliGRAM(s) Oral at bedtime PRN Insomnia

## 2020-08-22 NOTE — PROGRESS NOTE ADULT - SUBJECTIVE AND OBJECTIVE BOX
Patient is a 64y old  Male who presents with a chief complaint of Colitis (22 Aug 2020 14:57)      HPI:  65 yo male hx of dvt on xarelto, anemia schizoaffective disorder, constipation with fecal impaction, MR , nonverbal.      Patient is awake , but cant give hx      REVIEW OF SYSTEMS:  unable to obtain  PAST MEDICAL & SURGICAL HISTORY:  DVT, lower extremity  Cerebral palsy  Schizoaffective disorder  No significant past surgical history: no known past surgeires per care giver      FAMILY HISTORY:  No pertinent family history in first degree relatives: family medical hx is unknown      SOCIAL HISTORY:  Smoking Status: [ ] Current, [ ] Former, [ ] Never  Pack Years:  [  ] EtOH  [  ] IVDA    MEDICATIONS:  MEDICATIONS  (STANDING):  ciprofloxacin   IVPB 400 milliGRAM(s) IV Intermittent every 12 hours  lactulose Syrup 30 Gram(s) Oral three times a day  melatonin 3 milliGRAM(s) Oral at bedtime  metroNIDAZOLE  IVPB 500 milliGRAM(s) IV Intermittent every 8 hours  mineral oil enema 133 milliLiter(s) Rectal every 12 hours  OLANZapine 2.5 milliGRAM(s) Oral daily  OLANZapine 10 milliGRAM(s) Oral at bedtime  pantoprazole    Tablet 40 milliGRAM(s) Oral before breakfast  polyethylene glycol 3350 17 Gram(s) Oral two times a day  rivaroxaban 20 milliGRAM(s) Oral with dinner  saccharomyces boulardii 250 milliGRAM(s) Oral two times a day  senna 2 Tablet(s) Oral at bedtime    MEDICATIONS  (PRN):  temazepam 15 milliGRAM(s) Oral at bedtime PRN Insomnia      Allergies    No Known Allergies    Intolerances        Vital Signs Last 24 Hrs  T(C): 36.4 (22 Aug 2020 15:21), Max: 36.9 (22 Aug 2020 00:31)  T(F): 97.5 (22 Aug 2020 15:21), Max: 98.4 (22 Aug 2020 00:31)  HR: 86 (22 Aug 2020 15:21) (68 - 100)  BP: 148/92 (22 Aug 2020 15:21) (93/65 - 148/92)  BP(mean): --  RR: 18 (22 Aug 2020 15:21) (18 - 19)  SpO2: 97% (22 Aug 2020 15:21) (94% - 97%)        PHYSICAL EXAM:    General: Well developed; well nourished; in no acute distress  HEENT: MMM, conjunctiva and sclera clear  Gastrointestinal: Soft, non-tender non-distended; Normal bowel sounds; No rebound or guarding  Extremities: Normal range of motion, No clubbing, cyanosis or edema  Neurological: Alert and oriented x3  Skin: Warm and dry. No obvious rash      LABS:                        13.9   2.94  )-----------( 117      ( 22 Aug 2020 08:46 )             41.5     22 Aug 2020 08:46    145    |  108    |  8.0    ----------------------------<  80     3.1     |  24.0   |  0.51     Ca    9.3        22 Aug 2020 08:46                RADIOLOGY & ADDITIONAL STUDIES:

## 2020-08-22 NOTE — PROGRESS NOTE ADULT - ASSESSMENT
64M with cerebral palsy, schizoaffective disorder, anemia, DVT, and fecal impaction who was referred to the hospital for multiple episodes of diarrhea with CT of the abdomen noting a large amount of stool in the rectum with distention and wall thickening concerning for stercoral colitis.    Constipation  slow transit with stercoral colitis . Cipro iv 400mg bid   GI is following .   Lactulose 30g po tid   Miralax 17g bid  Mineral oil.     Polycythemia - Blood count improved.    DVT - On rivaroxaban for anticoagulation.    Schizoaffective disorder - On olanzapine and temazepam.    GERD - On pantoprazole.

## 2020-08-23 DIAGNOSIS — K52.9 NONINFECTIVE GASTROENTERITIS AND COLITIS, UNSPECIFIED: ICD-10-CM

## 2020-08-23 LAB
ANION GAP SERPL CALC-SCNC: 15 MMOL/L — SIGNIFICANT CHANGE UP (ref 5–17)
BUN SERPL-MCNC: 6 MG/DL — LOW (ref 8–20)
CALCIUM SERPL-MCNC: 9.7 MG/DL — SIGNIFICANT CHANGE UP (ref 8.6–10.2)
CHLORIDE SERPL-SCNC: 105 MMOL/L — SIGNIFICANT CHANGE UP (ref 98–107)
CO2 SERPL-SCNC: 23 MMOL/L — SIGNIFICANT CHANGE UP (ref 22–29)
CREAT SERPL-MCNC: 0.56 MG/DL — SIGNIFICANT CHANGE UP (ref 0.5–1.3)
GLUCOSE SERPL-MCNC: 85 MG/DL — SIGNIFICANT CHANGE UP (ref 70–99)
HCT VFR BLD CALC: 45.3 % — SIGNIFICANT CHANGE UP (ref 39–50)
HGB BLD-MCNC: 15.2 G/DL — SIGNIFICANT CHANGE UP (ref 13–17)
MAGNESIUM SERPL-MCNC: 2.1 MG/DL — SIGNIFICANT CHANGE UP (ref 1.6–2.6)
MCHC RBC-ENTMCNC: 29.7 PG — SIGNIFICANT CHANGE UP (ref 27–34)
MCHC RBC-ENTMCNC: 33.6 GM/DL — SIGNIFICANT CHANGE UP (ref 32–36)
MCV RBC AUTO: 88.5 FL — SIGNIFICANT CHANGE UP (ref 80–100)
PHOSPHATE SERPL-MCNC: 2.8 MG/DL — SIGNIFICANT CHANGE UP (ref 2.4–4.7)
PLATELET # BLD AUTO: 131 K/UL — LOW (ref 150–400)
POTASSIUM SERPL-MCNC: 4.5 MMOL/L — SIGNIFICANT CHANGE UP (ref 3.5–5.3)
POTASSIUM SERPL-SCNC: 4.5 MMOL/L — SIGNIFICANT CHANGE UP (ref 3.5–5.3)
RBC # BLD: 5.12 M/UL — SIGNIFICANT CHANGE UP (ref 4.2–5.8)
RBC # FLD: 13.5 % — SIGNIFICANT CHANGE UP (ref 10.3–14.5)
SODIUM SERPL-SCNC: 143 MMOL/L — SIGNIFICANT CHANGE UP (ref 135–145)
WBC # BLD: 3.69 K/UL — LOW (ref 3.8–10.5)
WBC # FLD AUTO: 3.69 K/UL — LOW (ref 3.8–10.5)

## 2020-08-23 PROCEDURE — 99233 SBSQ HOSP IP/OBS HIGH 50: CPT

## 2020-08-23 RX ORDER — LACTULOSE 10 G/15ML
45 SOLUTION ORAL THREE TIMES A DAY
Refills: 0 | Status: DISCONTINUED | OUTPATIENT
Start: 2020-08-23 | End: 2020-08-25

## 2020-08-23 RX ADMIN — Medication 100 MILLIGRAM(S): at 01:16

## 2020-08-23 RX ADMIN — LACTULOSE 45 GRAM(S): 10 SOLUTION ORAL at 23:02

## 2020-08-23 RX ADMIN — Medication 133 MILLILITER(S): at 05:11

## 2020-08-23 RX ADMIN — TEMAZEPAM 15 MILLIGRAM(S): 15 CAPSULE ORAL at 23:03

## 2020-08-23 RX ADMIN — PANTOPRAZOLE SODIUM 40 MILLIGRAM(S): 20 TABLET, DELAYED RELEASE ORAL at 05:22

## 2020-08-23 RX ADMIN — POLYETHYLENE GLYCOL 3350 17 GRAM(S): 17 POWDER, FOR SOLUTION ORAL at 05:22

## 2020-08-23 RX ADMIN — LACTULOSE 30 GRAM(S): 10 SOLUTION ORAL at 05:22

## 2020-08-23 RX ADMIN — Medication 100 MILLIGRAM(S): at 09:19

## 2020-08-23 RX ADMIN — Medication 250 MILLIGRAM(S): at 17:38

## 2020-08-23 RX ADMIN — Medication 100 MILLIGRAM(S): at 19:04

## 2020-08-23 RX ADMIN — OLANZAPINE 2.5 MILLIGRAM(S): 15 TABLET, FILM COATED ORAL at 12:09

## 2020-08-23 RX ADMIN — Medication 3 MILLIGRAM(S): at 23:14

## 2020-08-23 RX ADMIN — Medication 200 MILLIGRAM(S): at 05:11

## 2020-08-23 RX ADMIN — Medication 250 MILLIGRAM(S): at 05:22

## 2020-08-23 RX ADMIN — OLANZAPINE 10 MILLIGRAM(S): 15 TABLET, FILM COATED ORAL at 23:15

## 2020-08-23 RX ADMIN — RIVAROXABAN 20 MILLIGRAM(S): KIT at 16:40

## 2020-08-23 RX ADMIN — POLYETHYLENE GLYCOL 3350 17 GRAM(S): 17 POWDER, FOR SOLUTION ORAL at 17:38

## 2020-08-23 RX ADMIN — SENNA PLUS 2 TABLET(S): 8.6 TABLET ORAL at 23:15

## 2020-08-23 RX ADMIN — Medication 200 MILLIGRAM(S): at 17:38

## 2020-08-23 RX ADMIN — LACTULOSE 45 GRAM(S): 10 SOLUTION ORAL at 14:05

## 2020-08-23 NOTE — PROGRESS NOTE ADULT - SUBJECTIVE AND OBJECTIVE BOX
CC: Constipation, fecal impaction in schizoaffective disorder.   HPI:  63 yo male hx of dvt on xarelto, anemia schizoaffective disorder, constipation with fecal impaction, MR/CP, nonverbal. BIBA with home aid with 1 evening of multiple episodes of diarrhea for the past 1 day progressively getting worst. ually happens when he is impacted. last impaction January of this past year when found to have DVT. As per aid acting normal no distress. no recent fevers chills eating and drinking well at home. no blood in the stool. Usually has bowel movements once every other day and on senna lactulose. In ER CT scan shows Stercoral colitis. Suspicion for decreased enhancement in the posterior wall of the rectum as above which raises concern for ischemia. GI consulted by ED and admitted to medical service. (18 Aug 2020 10:53)    REVIEW OF SYSTEMS:    Patient denied fever, chills, abdominal pain, nausea, vomiting, cough, shortness of breath, chest pain or palpitations    Vital Signs Last 24 Hrs  T(C): 36.2 (23 Aug 2020 15:22), Max: 36.7 (23 Aug 2020 14:44)  T(F): 97.2 (23 Aug 2020 15:22), Max: 98.1 (23 Aug 2020 14:44)  HR: 75 (23 Aug 2020 15:22) (71 - 91)  BP: 124/70 (23 Aug 2020 15:22) (102/61 - 130/89)  BP(mean): --  RR: 19 (23 Aug 2020 15:22) (17 - 19)  SpO2: 93% (23 Aug 2020 15:22) (93% - 98%)I&O's Summary    PHYSICAL EXAM:  GENERAL: NAD, well-groomed  HEENT: PERRL, +EOMI, anicteric, no [place]  NECK: Supple, No JVD   CHEST/LUNG: CTA bilaterally; Normal effort  HEART: S1S2 Normal intensity, no murmurs, gallops or rubs noted  ABDOMEN: Soft, BS Normoactive, NT, ND, no HSM noted  EXTREMITIES:  2+ radial and DP pulses noted, no clubbing, cyanosis, or edema noted, FROM x 4  SKIN: No rashes or lesions noted  NEURO: A&Ox3, no focal deficits noted, CN II-XII intact  PSYCH: normal mood and affect; insight/judgement appropriate  LABS:                        15.2   3.69  )-----------( 131      ( 23 Aug 2020 09:10 )             45.3     08-23    143  |  105  |  6.0<L>  ----------------------------<  85  4.5   |  23.0  |  0.56    Ca    9.7      23 Aug 2020 09:10  Phos  2.8     08-23  Mg     2.1     08-23          RADIOLOGY & ADDITIONAL TESTS:    MEDICATIONS:  MEDICATIONS  (STANDING):  ciprofloxacin   IVPB 400 milliGRAM(s) IV Intermittent every 12 hours  lactulose Syrup 45 Gram(s) Oral three times a day  melatonin 3 milliGRAM(s) Oral at bedtime  metroNIDAZOLE  IVPB 500 milliGRAM(s) IV Intermittent every 8 hours  OLANZapine 2.5 milliGRAM(s) Oral daily  OLANZapine 10 milliGRAM(s) Oral at bedtime  pantoprazole    Tablet 40 milliGRAM(s) Oral before breakfast  polyethylene glycol 3350 17 Gram(s) Oral two times a day  rivaroxaban 20 milliGRAM(s) Oral with dinner  saccharomyces boulardii 250 milliGRAM(s) Oral two times a day  senna 2 Tablet(s) Oral at bedtime    MEDICATIONS  (PRN):  temazepam 15 milliGRAM(s) Oral at bedtime PRN Insomnia

## 2020-08-23 NOTE — PROGRESS NOTE ADULT - ASSESSMENT
64M with cerebral palsy, schizoaffective disorder, anemia, DVT, and fecal impaction who was referred to the hospital for multiple episodes of diarrhea with CT of the abdomen noting a large amount of stool in the rectum with distention and wall thickening concerning for stercoral colitis.    Constipation  slow transit with stercoral colitis . Cipro iv 400mg bid   GI is following considering atony or proximal impaction as no significant bowel movement with  laxative doses so far  To obtain KUB  Lactulose 45g po tid   Miralax 17g bid  Mineral oil.     Polycythemia - Blood count improved.    DVT - On rivaroxaban for anticoagulation.    Schizoaffective disorder - On olanzapine and temazepam.    GERD - On pantoprazole.

## 2020-08-23 NOTE — PROGRESS NOTE ADULT - SUBJECTIVE AND OBJECTIVE BOX
Patient is a 64y old  Male who presents with a chief complaint of Colitis (22 Aug 2020 18:51)      HPI:  63 yo male hx of dvt on xarelto, anemia schizoaffective disorder, constipation with fecal impaction, MR/CP, nonverbal.       Patient is altert, - unable to give Hx. No fevers. Eating as per nurse. NO BM with enema yesterday. no vomting    REVIEW OF SYSTEMS:  unable to obtain      PAST MEDICAL & SURGICAL HISTORY:  DVT, lower extremity  Cerebral palsy  Schizoaffective disorder  No significant past surgical history: no known past surgeires per care giver      FAMILY HISTORY:  No pertinent family history in first degree relatives: family medical hx is unknown      SOCIAL HISTORY:  Smoking Status: [ ] Current, [ ] Former, [ ] Never  Pack Years:  [  ] EtOH-no  [  ] IVDA    MEDICATIONS:  MEDICATIONS  (STANDING):  ciprofloxacin   IVPB 400 milliGRAM(s) IV Intermittent every 12 hours  lactulose Syrup 45 Gram(s) Oral three times a day  melatonin 3 milliGRAM(s) Oral at bedtime  metroNIDAZOLE  IVPB 500 milliGRAM(s) IV Intermittent every 8 hours  OLANZapine 2.5 milliGRAM(s) Oral daily  OLANZapine 10 milliGRAM(s) Oral at bedtime  pantoprazole    Tablet 40 milliGRAM(s) Oral before breakfast  polyethylene glycol 3350 17 Gram(s) Oral two times a day  rivaroxaban 20 milliGRAM(s) Oral with dinner  saccharomyces boulardii 250 milliGRAM(s) Oral two times a day  senna 2 Tablet(s) Oral at bedtime    MEDICATIONS  (PRN):  temazepam 15 milliGRAM(s) Oral at bedtime PRN Insomnia      Allergies    No Known Allergies    Intolerances        Vital Signs Last 24 Hrs  T(C): 36.4 (23 Aug 2020 07:10), Max: 36.6 (23 Aug 2020 05:29)  T(F): 97.6 (23 Aug 2020 07:10), Max: 97.8 (23 Aug 2020 05:29)  HR: 77 (23 Aug 2020 07:10) (71 - 86)  BP: 102/61 (23 Aug 2020 07:10) (102/61 - 148/92)  BP(mean): --  RR: 17 (23 Aug 2020 07:10) (17 - 18)  SpO2: 97% (23 Aug 2020 05:29) (97% - 98%)        PHYSICAL EXAM:    General: Well developed; well nourished; in no acute distress  HEENT: MMM, conjunctiva and sclera clear  H- RRR  L- CTA  Gastrointestinal: Soft, non-tender non-distended; Normal bowel sounds; No rebound or guarding  Extremities: Normal range of motion, No clubbing, cyanosis or edema  Neurological: Alert and oriented x0   Skin: Warm and dry. No obvious rash  rectal - no fecal impaction on exam      LABS:                        13.9   2.94  )-----------( 117      ( 22 Aug 2020 08:46 )             41.5     22 Aug 2020 08:46    145    |  108    |  8.0    ----------------------------<  80     3.1     |  24.0   |  0.51     Ca    9.3        22 Aug 2020 08:46                RADIOLOGY & ADDITIONAL STUDIES:   < from: CT Abdomen and Pelvis w/ IV Cont (08.18.20 @ 08:42) >  MPRESSION:  Stercoral colitis. Suspicion for decreased enhancement in the posterior wall of the rectum as above which raises concern for ischemia. This was discussed with Dr Antonio in the emergency room at 9:30 AM on 8/18/2020.      < end of copied text >

## 2020-08-23 NOTE — DIETITIAN INITIAL EVALUATION ADULT. - OTHER INFO
65 yo male hx of dvt on xarelto, anemia schizoaffective disorder, constipation with fecal impaction, MR/CP, nonverbal. BIBA with home aid with 1 evening of multiple episodes of diarrhea for the past 1 day progressively getting worst. ually happens when he is impacted. last impaction January of this past year when found to have DVT. As per aid acting normal no distress. no recent fevers chills eating and drinking well at home. no blood in the stool. Usually has bowel movements once every other day and on senna lactulose. In ER CT scan shows Stercoral colitis. Bedscale reading 49.1kg. Pt consuming 100% of meals per documentation with total assistance. No ht documented in EMR.

## 2020-08-23 NOTE — DIETITIAN INITIAL EVALUATION ADULT. - PERTINENT LABORATORY DATA
08-23 Na143 mmol/L Glu 85 mg/dL K+ 4.5 mmol/L Cr  0.56 mg/dL BUN 6.0 mg/dL<L> Phos 2.8 mg/dL Alb n/a   PAB n/a

## 2020-08-24 PROCEDURE — 74018 RADEX ABDOMEN 1 VIEW: CPT | Mod: 26

## 2020-08-24 PROCEDURE — 99239 HOSP IP/OBS DSCHRG MGMT >30: CPT

## 2020-08-24 PROCEDURE — 99232 SBSQ HOSP IP/OBS MODERATE 35: CPT

## 2020-08-24 RX ORDER — LACTULOSE 10 G/15ML
30 SOLUTION ORAL
Qty: 600 | Refills: 0
Start: 2020-08-24 | End: 2020-09-02

## 2020-08-24 RX ADMIN — Medication 250 MILLIGRAM(S): at 17:05

## 2020-08-24 RX ADMIN — RIVAROXABAN 20 MILLIGRAM(S): KIT at 17:05

## 2020-08-24 RX ADMIN — Medication 100 MILLIGRAM(S): at 10:10

## 2020-08-24 RX ADMIN — POLYETHYLENE GLYCOL 3350 17 GRAM(S): 17 POWDER, FOR SOLUTION ORAL at 05:08

## 2020-08-24 RX ADMIN — Medication 100 MILLIGRAM(S): at 17:04

## 2020-08-24 RX ADMIN — Medication 100 MILLIGRAM(S): at 02:20

## 2020-08-24 RX ADMIN — PANTOPRAZOLE SODIUM 40 MILLIGRAM(S): 20 TABLET, DELAYED RELEASE ORAL at 05:06

## 2020-08-24 RX ADMIN — Medication 250 MILLIGRAM(S): at 05:06

## 2020-08-24 RX ADMIN — Medication 200 MILLIGRAM(S): at 17:04

## 2020-08-24 RX ADMIN — LACTULOSE 45 GRAM(S): 10 SOLUTION ORAL at 05:08

## 2020-08-24 RX ADMIN — OLANZAPINE 2.5 MILLIGRAM(S): 15 TABLET, FILM COATED ORAL at 13:15

## 2020-08-24 RX ADMIN — Medication 200 MILLIGRAM(S): at 05:07

## 2020-08-24 RX ADMIN — Medication 3 MILLIGRAM(S): at 21:16

## 2020-08-24 RX ADMIN — OLANZAPINE 10 MILLIGRAM(S): 15 TABLET, FILM COATED ORAL at 21:16

## 2020-08-24 NOTE — DISCHARGE NOTE PROVIDER - HOSPITAL COURSE
63 yo male hx of dvt on xarelto, anemia schizoaffective disorder, constipation with fecal impaction, MR/CP, nonverbal. BIBA with home aid with 1 evening of multiple episodes of diarrhea for the past 1 day progressively getting worst. ually happens when he is impacted. last impaction January of this past year when found to have DVT. As per aid acting normal no distress. no recent fevers chills eating and drinking well at home. no blood in the stool. Usually has bowel movements once every other day and on senna lactulose. In ER CT scan shows Stercoral colitis. Suspicion for decreased enhancement in the posterior wall of the rectum as above which raises concern for ischemia. GI consulted and recommended cipro/flagyl to cover stercoral colitis and a bowel regimen for his constipation. pt had a BM on 8/23/2020. KUB noted for distal fecal residue, no obstruction. At this time pt is stable for Discharge back to facility. Covid screening ordered. 63 yo male hx of dvt on xarelto, anemia schizoaffective disorder, constipation with fecal impaction, MR/CP, nonverbal. BIBA with home aid with 1 evening of multiple episodes of diarrhea for the past 1 day progressively getting worst. ually happens when he is impacted. last impaction January of this past year when found to have DVT. As per aid acting normal no distress. no recent fevers chills eating and drinking well at home. no blood in the stool. Usually has bowel movements once every other day and on senna lactulose. In ER CT scan shows Stercoral colitis. Suspicion for decreased enhancement in the posterior wall of the rectum as above which raises concern for ischemia. GI consulted and recommended cipro/flagyl to cover stercoral colitis and a bowel regimen for his constipation. pt had a BM on 8/23/2020. KUB noted for distal fecal residue, no obstruction. Pt. had multiple BMs on 8/24. At this time pt is stable for Discharge back to facility. Pt. is completely dependent. Covid screening negative. 63 yo male hx of dvt on xarelto, anemia schizoaffective disorder, constipation with fecal impaction, MR/CP, nonverbal. BIBA with home aid with 1 evening of multiple episodes of diarrhea for the past 1 day progressively getting worst. ually happens when he is impacted. last impaction January of this past year when found to have DVT. As per aid acting normal no distress. no recent fevers chills eating and drinking well at home. no blood in the stool. Usually has bowel movements once every other day and on senna lactulose. In ER CT scan shows Stercoral colitis. Suspicion for decreased enhancement in the posterior wall of the rectum as above which raises concern for ischemia. GI consulted and recommended cipro/flagyl to cover stercoral colitis and a bowel regimen for his constipation. pt had a BM on 8/23/2020. KUB noted for distal fecal residue, no obstruction. Pt. had multiple BMs on 8/24. At this time pt is stable for Discharge back to facility. Pt. is completely dependent. Covid screening negative.            Vital Signs Last 24 Hrs    T(C): 37 (25 Aug 2020 08:33), Max: 37.1 (25 Aug 2020 00:03)    T(F): 98.6 (25 Aug 2020 08:33), Max: 98.7 (25 Aug 2020 00:03)    HR: 65 (25 Aug 2020 08:33) (51 - 69)    BP: 118/69 (25 Aug 2020 08:33) (100/62 - 128/81)    RR: 19 (25 Aug 2020 08:33) (18 - 19)    SpO2: 96% (25 Aug 2020 08:33) (96% - 97%) 65 yo male hx of dvt on xarelto, anemia schizoaffective disorder, constipation with fecal impaction, MR/CP, nonverbal. BIBA with home aid with 1 evening of multiple episodes of diarrhea for the past 1 day progressively getting worst. ually happens when he is impacted. last impaction January of this past year when found to have DVT. As per aid acting normal no distress. no recent fevers chills eating and drinking well at home. no blood in the stool. Usually has bowel movements once every other day and on senna lactulose. In ER CT scan shows Stercoral colitis. Suspicion for decreased enhancement in the posterior wall of the rectum as above which raises concern for ischemia. GI consulted and recommended cipro/flagyl to cover stercoral colitis and a bowel regimen for his constipation. pt had a BM on 8/23/2020. KUB noted for distal fecal residue, no obstruction. Pt. had multiple BMs on 8/24. At this time pt is stable for Discharge back to home with aids. Pt. is completely dependent. Covid screening negative.            Vital Signs Last 24 Hrs    T(C): 37 (25 Aug 2020 08:33), Max: 37.1 (25 Aug 2020 00:03)    T(F): 98.6 (25 Aug 2020 08:33), Max: 98.7 (25 Aug 2020 00:03)    HR: 65 (25 Aug 2020 08:33) (51 - 69)    BP: 118/69 (25 Aug 2020 08:33) (100/62 - 128/81)    RR: 19 (25 Aug 2020 08:33) (18 - 19)    SpO2: 96% (25 Aug 2020 08:33) (96% - 97%)

## 2020-08-24 NOTE — PROGRESS NOTE ADULT - SUBJECTIVE AND OBJECTIVE BOX
INTERVAL HPI/OVERNIGHT EVENTS:FU for stercoral colitis and constipation. Had BM with current bowel regimen as per RN. Eating. No complaints    MEDICATIONS  (STANDING):  ciprofloxacin   IVPB 400 milliGRAM(s) IV Intermittent every 12 hours  lactulose Syrup 45 Gram(s) Oral three times a day  melatonin 3 milliGRAM(s) Oral at bedtime  metroNIDAZOLE  IVPB 500 milliGRAM(s) IV Intermittent every 8 hours  OLANZapine 2.5 milliGRAM(s) Oral daily  OLANZapine 10 milliGRAM(s) Oral at bedtime  pantoprazole    Tablet 40 milliGRAM(s) Oral before breakfast  polyethylene glycol 3350 17 Gram(s) Oral two times a day  rivaroxaban 20 milliGRAM(s) Oral with dinner  saccharomyces boulardii 250 milliGRAM(s) Oral two times a day  senna 2 Tablet(s) Oral at bedtime    MEDICATIONS  (PRN):  temazepam 15 milliGRAM(s) Oral at bedtime PRN Insomnia      Allergies    No Known Allergies    Intolerances        Vital Signs Last 24 Hrs  T(C): 36.8 (24 Aug 2020 07:25), Max: 36.8 (23 Aug 2020 23:42)  T(F): 98.2 (24 Aug 2020 07:25), Max: 98.2 (23 Aug 2020 23:42)  HR: 94 (24 Aug 2020 07:25) (75 - 94)  BP: 111/85 (24 Aug 2020 07:25) (111/85 - 130/89)  BP(mean): --  RR: 19 (24 Aug 2020 07:25) (18 - 19)  SpO2: 95% (24 Aug 2020 07:25) (93% - 96%)    LABS:                        15.2   3.69  )-----------( 131      ( 23 Aug 2020 09:10 )             45.3     08-23    143  |  105  |  6.0<L>  ----------------------------<  85  4.5   |  23.0  |  0.56    Ca    9.7      23 Aug 2020 09:10  Phos  2.8     08-23  Mg     2.1     08-23            RADIOLOGY & ADDITIONAL TESTS:

## 2020-08-24 NOTE — DISCHARGE NOTE PROVIDER - CARE PROVIDERS DIRECT ADDRESSES
Stable, working on quitting Continue present management. ,DirectAddress_Unknown ,DirectAddress_Unknown,[email or web address]

## 2020-08-24 NOTE — SWALLOW BEDSIDE ASSESSMENT ADULT - SWALLOW EVAL: RECOMMENDED FEEDING/EATING TECHNIQUES
small sips/bites/Can use straw with assistance, small, single sips via straw/position upright (90 degrees)/maintain upright posture during/after eating for 30 mins/oral hygiene/crush medication (when feasible)

## 2020-08-24 NOTE — DISCHARGE NOTE PROVIDER - CARE PROVIDER_API CALL
PCP,   Phone: (   )    -  Fax: (   )    -  Follow Up Time: 1 week PCP,   Phone: (   )    -  Fax: (   )    -  Follow Up Time: 1 week    Nagi Villasenor  GASTROENTEROLOGY  [address] [place] [address]  Phone: [phone]  Fax: [phone]  Follow Up Time: 1 week

## 2020-08-24 NOTE — SWALLOW BEDSIDE ASSESSMENT ADULT - SLP GENERAL OBSERVATIONS
Pt received A&A in bed, reduced cognition, poor command following, minimally verbal (occasional true words: "more, "no water," "better"), however cooperative with evaluation

## 2020-08-24 NOTE — DISCHARGE NOTE PROVIDER - PROVIDER TOKENS
FREE:[LAST:[PCP],PHONE:[(   )    -],FAX:[(   )    -],FOLLOWUP:[1 week]] FREE:[LAST:[PCP],PHONE:[(   )    -],FAX:[(   )    -],FOLLOWUP:[1 week]],PROVIDER:[TOKEN:[[id number]],FOLLOWUP:[1 week]]

## 2020-08-24 NOTE — DISCHARGE NOTE PROVIDER - NSDCCPCAREPLAN_GEN_ALL_CORE_FT
PRINCIPAL DISCHARGE DIAGNOSIS  Diagnosis: Colitis  Assessment and Plan of Treatment:       SECONDARY DISCHARGE DIAGNOSES  Diagnosis: Diarrhea, unspecified type  Assessment and Plan of Treatment: PRINCIPAL DISCHARGE DIAGNOSIS  Diagnosis: Colitis  Assessment and Plan of Treatment: PO abx, Protonix, bowel regimen      SECONDARY DISCHARGE DIAGNOSES  Diagnosis: Diarrhea, unspecified type  Assessment and Plan of Treatment: PO abx

## 2020-08-24 NOTE — DISCHARGE NOTE PROVIDER - NSDCMRMEDTOKEN_GEN_ALL_CORE_FT
diphenhydrAMINE 25 mg oral tablet: 1 tab(s) orally once a day  lactulose 10 g/15 mL oral solution: orally 2 times a day  Melatonin 3 mg oral tablet: 1 tab(s) orally once a day (at bedtime)  Multiple Vitamins oral tablet: 1 tab(s) orally once a day  OLANZapine 10 mg oral tablet: 1 tab(s) orally once a day (at bedtime)  OLANZapine 20 mg oral tablet: 1 tab(s) orally once a day  omeprazole 20 mg oral delayed release capsule: 1 cap(s) orally once a day  Senna 8.6 mg oral tablet: 2 tab(s) orally once a day (at bedtime)  temazepam 15 mg oral capsule: 1 cap(s) orally once a day (at bedtime)  Vitamin C 500 mg oral tablet: 1 tab(s) orally once a day  Vitamin D3 1000 intl units (25 mcg) oral tablet: orally once a day  Xarelto 20 mg oral tablet: 1 tab(s) orally once a day (in the evening) bisacodyl 5 mg oral delayed release tablet: 2 tab(s) orally 2 times a day MDD:Hold for diarrhea  diphenhydrAMINE 25 mg oral tablet: 1 tab(s) orally once a day  lactulose 10 g/15 mL oral solution: 30 milliliter(s) orally 2 times a day   Melatonin 3 mg oral tablet: 1 tab(s) orally once a day (at bedtime)  Multiple Vitamins oral tablet: 1 tab(s) orally once a day  OLANZapine 10 mg oral tablet: 1 tab(s) orally once a day (at bedtime)  OLANZapine 20 mg oral tablet: 1 tab(s) orally once a day  Senna 8.6 mg oral tablet: 2 tab(s) orally once a day (at bedtime)  temazepam 15 mg oral capsule: 1 cap(s) orally once a day (at bedtime)  Vitamin C 500 mg oral tablet: 1 tab(s) orally once a day  Vitamin D3 1000 intl units (25 mcg) oral tablet: orally once a day  Xarelto 20 mg oral tablet: 1 tab(s) orally once a day (in the evening) bisacodyl 5 mg oral delayed release tablet: 2 tab(s) orally 2 times a day MDD:Hold for diarrhea  Cipro 500 mg oral tablet: 1 tab(s) orally 2 times a day MDD:Three more days to completed 10 days total of antibiotics for colitis  diphenhydrAMINE 25 mg oral tablet: 1 tab(s) orally once a day  lactulose 10 g/15 mL oral solution: 30 milliliter(s) orally 2 times a day   Melatonin 3 mg oral tablet: 1 tab(s) orally once a day (at bedtime)  Multiple Vitamins oral tablet: 1 tab(s) orally once a day  OLANZapine 10 mg oral tablet: 1 tab(s) orally once a day (at bedtime)  OLANZapine 20 mg oral tablet: 1 tab(s) orally once a day  Senna 8.6 mg oral tablet: 2 tab(s) orally once a day (at bedtime)  temazepam 15 mg oral capsule: 1 cap(s) orally once a day (at bedtime)  Vitamin C 500 mg oral tablet: 1 tab(s) orally once a day  Vitamin D3 1000 intl units (25 mcg) oral tablet: orally once a day  Xarelto 20 mg oral tablet: 1 tab(s) orally once a day (in the evening) bisacodyl 5 mg oral delayed release tablet: 2 tab(s) orally 2 times a day MDD:Hold for diarrhea  Cipro 500 mg oral tablet: 1 tab(s) orally 2 times a day MDD:Three more days to completed 10 days total of antibiotics for colitis  diphenhydrAMINE 25 mg oral tablet: 1 tab(s) orally once a day  lactulose 10 g/15 mL oral solution: 30 milliliter(s) orally 2 times a day   Melatonin 3 mg oral tablet: 1 tab(s) orally once a day (at bedtime)  Multiple Vitamins oral tablet: 1 tab(s) orally once a day  OLANZapine 10 mg oral tablet: 1 tab(s) orally once a day (at bedtime)  OLANZapine 20 mg oral tablet: 1 tab(s) orally once a day  omeprazole 20 mg oral delayed release capsule: 1 cap(s) orally once a day  saccharomyces boulardii lyo 250 mg oral capsule: 1 cap(s) orally 2 times a day  Senna 8.6 mg oral tablet: 2 tab(s) orally once a day (at bedtime)  temazepam 15 mg oral capsule: 1 cap(s) orally once a day (at bedtime)  Vitamin C 500 mg oral tablet: 1 tab(s) orally once a day  Vitamin D3 1000 intl units (25 mcg) oral tablet: orally once a day  Xarelto 20 mg oral tablet: 1 tab(s) orally once a day (in the evening)

## 2020-08-24 NOTE — SWALLOW BEDSIDE ASSESSMENT ADULT - SLP PERTINENT HISTORY OF CURRENT PROBLEM
64M with cerebral palsy, schizoaffective disorder, anemia, DVT, and fecal impaction who was referred to the hospital for multiple episodes of diarrhea with CT of the abdomen noting a large amount of stool in the rectum with distention and wall thickening concerning for stercoral colitis. pt has had several days of constipation. GI consulted and following. Last BM was yesterday (8/23). RN reports night RN noted inconsistent cough with thin liquids

## 2020-08-24 NOTE — SWALLOW BEDSIDE ASSESSMENT ADULT - ASR SWALLOW ASPIRATION MONITOR
change of breathing pattern/position upright (90Y)/cough/gurgly voice/oral hygiene/throat clearing/upper respiratory infection/fever/pneumonia

## 2020-08-24 NOTE — SWALLOW BEDSIDE ASSESSMENT ADULT - ORAL PHASE
Delayed oral transit time/+lingual incoordination +lingual incoordination with suspected posterior loss

## 2020-08-24 NOTE — SWALLOW BEDSIDE ASSESSMENT ADULT - SWALLOW EVAL: DIAGNOSIS
Moderate oral dysphagia marked by poor lip seal on cup rim, improved via use of straw and incoordination of oral musculature, however functional for puree and thin liquids. Pharyngeal stage also appears functional with no overt s/s aspiration across trials.

## 2020-08-24 NOTE — PROGRESS NOTE ADULT - ASSESSMENT
Patient with Patient with constipation, stercoral colitis. Doing better. Eating now. Reviewed abdominal X ray today. Continue same bowel regimen at this time. Repeat KUB tomorrow. Will follow up

## 2020-08-24 NOTE — PROGRESS NOTE ADULT - SUBJECTIVE AND OBJECTIVE BOX
ZHANG PATRICK Patient is a 64y old  Male who presents with a chief complaint of Colitis (24 Aug 2020 09:29)     The patient was seen and evaluated at bedside  Per RN pt had a BM last night  The patient is in no acute distress.  unable to obtain ROS nonverbal baseline   VSS at bedside    Vital Signs Last 24 Hrs  T(C): 36.8 (24 Aug 2020 07:25), Max: 36.8 (23 Aug 2020 23:42)  T(F): 98.2 (24 Aug 2020 07:25), Max: 98.2 (23 Aug 2020 23:42)  HR: 94 (24 Aug 2020 07:25) (75 - 94)  BP: 111/85 (24 Aug 2020 07:25) (111/85 - 130/89)  BP(mean): --  RR: 19 (24 Aug 2020 07:25) (18 - 19)  SpO2: 95% (24 Aug 2020 07:25) (93% - 96%)T(C): 36.8 (08-24-20 @ 07:25), Max: 36.8 (08-23-20 @ 23:42)  HR: 94 (08-24-20 @ 07:25) (75 - 94)  BP: 111/85 (08-24-20 @ 07:25) (111/85 - 130/89)  RR: 19 (08-24-20 @ 07:25) (18 - 19)  SpO2: 95% (08-24-20 @ 07:25) (93% - 96%)  Wt(kg): --    PHYSICAL EXAM:    GENERAL: NAD  HEAD:  Atraumatic, Normocephalic  NECK: Supple, No JVD  NERVOUS SYSTEM: nonverbal. Awake and alert.  Moves upper extremities.  CHEST/LUNG: Clear to auscultation bilaterally; No rales, rhonchi, wheezing,   HEART: Regular rate and rhythm; No murmurs,   ABDOMEN: Soft, Nontender, Nondistended; Bowel sounds present  EXTREMITIES:  Peripheral Pulses, No  cyanosis, or edema  SKIN: No rashes or lesions      LABS: Reviewed

## 2020-08-24 NOTE — SWALLOW BEDSIDE ASSESSMENT ADULT - ORAL PREPARATORY PHASE
Reduced oral grading unable to achieve lip seal on cup rim, improved oral intake via straw administration/Anterior loss of bolus/Reduced oral grading

## 2020-08-25 VITALS
HEART RATE: 73 BPM | DIASTOLIC BLOOD PRESSURE: 83 MMHG | TEMPERATURE: 97 F | OXYGEN SATURATION: 95 % | SYSTOLIC BLOOD PRESSURE: 166 MMHG | RESPIRATION RATE: 18 BRPM

## 2020-08-25 DIAGNOSIS — K59.00 CONSTIPATION, UNSPECIFIED: ICD-10-CM

## 2020-08-25 LAB — SARS-COV-2 RNA SPEC QL NAA+PROBE: SIGNIFICANT CHANGE UP

## 2020-08-25 PROCEDURE — 36415 COLL VENOUS BLD VENIPUNCTURE: CPT

## 2020-08-25 PROCEDURE — 87641 MR-STAPH DNA AMP PROBE: CPT

## 2020-08-25 PROCEDURE — 87086 URINE CULTURE/COLONY COUNT: CPT

## 2020-08-25 PROCEDURE — 81001 URINALYSIS AUTO W/SCOPE: CPT

## 2020-08-25 PROCEDURE — 74177 CT ABD & PELVIS W/CONTRAST: CPT

## 2020-08-25 PROCEDURE — U0003: CPT

## 2020-08-25 PROCEDURE — 83605 ASSAY OF LACTIC ACID: CPT

## 2020-08-25 PROCEDURE — 84100 ASSAY OF PHOSPHORUS: CPT

## 2020-08-25 PROCEDURE — 97163 PT EVAL HIGH COMPLEX 45 MIN: CPT

## 2020-08-25 PROCEDURE — 96361 HYDRATE IV INFUSION ADD-ON: CPT

## 2020-08-25 PROCEDURE — 87640 STAPH A DNA AMP PROBE: CPT

## 2020-08-25 PROCEDURE — 80048 BASIC METABOLIC PNL TOTAL CA: CPT

## 2020-08-25 PROCEDURE — 87046 STOOL CULTR AEROBIC BACT EA: CPT

## 2020-08-25 PROCEDURE — 87045 FECES CULTURE AEROBIC BACT: CPT

## 2020-08-25 PROCEDURE — 87493 C DIFF AMPLIFIED PROBE: CPT

## 2020-08-25 PROCEDURE — 85027 COMPLETE CBC AUTOMATED: CPT

## 2020-08-25 PROCEDURE — 92610 EVALUATE SWALLOWING FUNCTION: CPT

## 2020-08-25 PROCEDURE — 86803 HEPATITIS C AB TEST: CPT

## 2020-08-25 PROCEDURE — 83690 ASSAY OF LIPASE: CPT

## 2020-08-25 PROCEDURE — 99285 EMERGENCY DEPT VISIT HI MDM: CPT | Mod: 25

## 2020-08-25 PROCEDURE — 99232 SBSQ HOSP IP/OBS MODERATE 35: CPT

## 2020-08-25 PROCEDURE — 83735 ASSAY OF MAGNESIUM: CPT

## 2020-08-25 PROCEDURE — 74018 RADEX ABDOMEN 1 VIEW: CPT

## 2020-08-25 PROCEDURE — 80053 COMPREHEN METABOLIC PANEL: CPT

## 2020-08-25 PROCEDURE — 96374 THER/PROPH/DIAG INJ IV PUSH: CPT | Mod: XU

## 2020-08-25 RX ORDER — TEMAZEPAM 15 MG/1
1 CAPSULE ORAL
Qty: 0 | Refills: 0 | DISCHARGE

## 2020-08-25 RX ORDER — OLANZAPINE 15 MG/1
1 TABLET, FILM COATED ORAL
Qty: 0 | Refills: 0 | DISCHARGE

## 2020-08-25 RX ORDER — DIPHENHYDRAMINE HCL 50 MG
1 CAPSULE ORAL
Qty: 0 | Refills: 0 | DISCHARGE

## 2020-08-25 RX ORDER — LACTULOSE 10 G/15ML
0 SOLUTION ORAL
Qty: 0 | Refills: 0 | DISCHARGE

## 2020-08-25 RX ORDER — CHOLECALCIFEROL (VITAMIN D3) 125 MCG
0 CAPSULE ORAL
Qty: 0 | Refills: 0 | DISCHARGE

## 2020-08-25 RX ORDER — OMEPRAZOLE 10 MG/1
1 CAPSULE, DELAYED RELEASE ORAL
Qty: 0 | Refills: 0 | DISCHARGE

## 2020-08-25 RX ORDER — LANOLIN ALCOHOL/MO/W.PET/CERES
1 CREAM (GRAM) TOPICAL
Qty: 0 | Refills: 0 | DISCHARGE

## 2020-08-25 RX ORDER — RIVAROXABAN 15 MG-20MG
1 KIT ORAL
Qty: 0 | Refills: 0 | DISCHARGE

## 2020-08-25 RX ORDER — SENNA PLUS 8.6 MG/1
2 TABLET ORAL
Qty: 0 | Refills: 0 | DISCHARGE

## 2020-08-25 RX ORDER — OMEPRAZOLE 10 MG/1
1 CAPSULE, DELAYED RELEASE ORAL
Qty: 30 | Refills: 0
Start: 2020-08-25 | End: 2020-09-23

## 2020-08-25 RX ORDER — MOXIFLOXACIN HYDROCHLORIDE TABLETS, 400 MG 400 MG/1
1 TABLET, FILM COATED ORAL
Qty: 6 | Refills: 0
Start: 2020-08-25 | End: 2020-08-27

## 2020-08-25 RX ORDER — ASCORBIC ACID 60 MG
1 TABLET,CHEWABLE ORAL
Qty: 0 | Refills: 0 | DISCHARGE

## 2020-08-25 RX ORDER — SACCHAROMYCES BOULARDII 250 MG
1 POWDER IN PACKET (EA) ORAL
Qty: 6 | Refills: 0
Start: 2020-08-25 | End: 2020-08-27

## 2020-08-25 RX ADMIN — OLANZAPINE 2.5 MILLIGRAM(S): 15 TABLET, FILM COATED ORAL at 12:37

## 2020-08-25 RX ADMIN — Medication 100 MILLIGRAM(S): at 01:02

## 2020-08-25 RX ADMIN — PANTOPRAZOLE SODIUM 40 MILLIGRAM(S): 20 TABLET, DELAYED RELEASE ORAL at 05:16

## 2020-08-25 RX ADMIN — Medication 250 MILLIGRAM(S): at 05:16

## 2020-08-25 RX ADMIN — Medication 200 MILLIGRAM(S): at 05:16

## 2020-08-25 RX ADMIN — Medication 100 MILLIGRAM(S): at 10:53

## 2020-08-25 NOTE — PROGRESS NOTE ADULT - SUBJECTIVE AND OBJECTIVE BOX
Chief Complaint: This is a 64y old man patient being seen in follow-up consultation for stercoral colitis and constipation..    HPI / 24H events:  Patient seeing and evaluated at bedside, as per nurse patient tolerating oral intake, had 3 soft BM's overnight,  no complains.                                                   .  ROS: Unable to obtain.    PAST MEDICAL/SURGICAL HISTORY:  DVT, lower extremity  Cerebral palsy  Schizoaffective disorder  No significant past surgical history: no known past surgeires per care giver    MEDICATIONS  (STANDING):  ciprofloxacin   IVPB 400 milliGRAM(s) IV Intermittent every 12 hours  lactulose Syrup 45 Gram(s) Oral three times a day  melatonin 3 milliGRAM(s) Oral at bedtime  metroNIDAZOLE  IVPB 500 milliGRAM(s) IV Intermittent every 8 hours  OLANZapine 2.5 milliGRAM(s) Oral daily  OLANZapine 10 milliGRAM(s) Oral at bedtime  pantoprazole    Tablet 40 milliGRAM(s) Oral before breakfast  polyethylene glycol 3350 17 Gram(s) Oral two times a day  rivaroxaban 20 milliGRAM(s) Oral with dinner  saccharomyces boulardii 250 milliGRAM(s) Oral two times a day  senna 2 Tablet(s) Oral at bedtime    MEDICATIONS  (PRN):  temazepam 15 milliGRAM(s) Oral at bedtime PRN Insomnia    No Known Allergies    T(C): 37 (08-25-20 @ 08:33), Max: 37.1 (08-25-20 @ 00:03)  HR: 65 (08-25-20 @ 08:33) (51 - 69)  BP: 118/69 (08-25-20 @ 08:33) (100/62 - 128/81)  RR: 19 (08-25-20 @ 08:33) (18 - 19)  SpO2: 96% (08-25-20 @ 08:33) (96% - 97%)    I&O's Summary    PHYSICAL EXAM:  Constitutional:  Found sitting comfortable, no apparent distress  Eyes: Sclerae anicteric, conjunctivae normal  ENMT: Mucus membranes moist, no oropharyngeal thrush noted  Respiratory: Breathing nonlabored; clear to auscultation  Cardiovascular: Regular rate and rhythm  Gastrointestinal: Soft, nontender, nondistended, normoactive bowel sounds; no hepatosplenomegaly appreciated; no rebound tenderness or involuntary guarding  Extremities: No clubbing, cyanosis or edema  Neurological: Alert and oriented to person, place and time; no asterixis  Skin: No jaundice                     15.2   3.69  )-----------( 131      ( 08-23 @ 09:10 )             45.3                       IMAGING: I personally reviewed the KUB, and I agree with the radiologist's interpretation as described below:    INTERPRETATION:  Abdomen one view    HISTORY: Fecal impaction    Frontal view of the abdomen shows a normal gas pattern with fecal residue projected in the rectum and distal sigmoid colon. No definite free air is identified. Right upper quadrant calcifications may be in the gallbladder. There is gaseous distention of the stomach.    IMPRESSION:  Distal fecal residue as noted.    Thank you for this referral. Chief Complaint: This is a 64y old man patient being seen in follow-up consultation for stercoral colitis and constipation.    HPI / 24H events:  Patient seeing and evaluated at bedside, as per nurse patient tolerating oral intake, had 3 soft BM's overnight, no complains.                                                     .  ROS: Unable to obtain.    PAST MEDICAL/SURGICAL HISTORY:  DVT, lower extremity  Cerebral palsy  Schizoaffective disorder  No significant past surgical history: no known past surgeries per care giver    MEDICATIONS  (STANDING):  ciprofloxacin   IVPB 400 milliGRAM(s) IV Intermittent every 12 hours  lactulose Syrup 45 Gram(s) Oral three times a day  melatonin 3 milliGRAM(s) Oral at bedtime  metroNIDAZOLE  IVPB 500 milliGRAM(s) IV Intermittent every 8 hours  OLANZapine 2.5 milliGRAM(s) Oral daily  OLANZapine 10 milliGRAM(s) Oral at bedtime  pantoprazole    Tablet 40 milliGRAM(s) Oral before breakfast  polyethylene glycol 3350 17 Gram(s) Oral two times a day  rivaroxaban 20 milliGRAM(s) Oral with dinner  saccharomyces boulardii 250 milliGRAM(s) Oral two times a day  senna 2 Tablet(s) Oral at bedtime    MEDICATIONS  (PRN):  temazepam 15 milliGRAM(s) Oral at bedtime PRN Insomnia    No Known Allergies    T(C): 37 (08-25-20 @ 08:33), Max: 37.1 (08-25-20 @ 00:03)  HR: 65 (08-25-20 @ 08:33) (51 - 69)  BP: 118/69 (08-25-20 @ 08:33) (100/62 - 128/81)  RR: 19 (08-25-20 @ 08:33) (18 - 19)  SpO2: 96% (08-25-20 @ 08:33) (96% - 97%)    I&O's Summary    PHYSICAL EXAM:  Constitutional:  Found sitting comfortable, no apparent distress  Eyes: Sclerae anicteric, conjunctivae normal  ENMT: Mucus membranes moist, no oropharyngeal thrush noted  Respiratory: Breathing nonlabored; clear to auscultation  Cardiovascular: Regular rate and rhythm  Gastrointestinal: Soft, nontender, nondistended, normoactive bowel sounds; no hepatosplenomegaly appreciated; no rebound tenderness or involuntary guarding  Extremities: No clubbing, cyanosis or edema  Neurological: Alert and oriented to person, place and time; no asterixis  Skin: No jaundice                   15.2   3.69  )-----------( 131      ( 08-23 @ 09:10 )             45.3         IMAGING: I personally reviewed the KUB, and I agree with the radiologist's interpretation as described below:    INTERPRETATION:  Abdomen one view    HISTORY: Fecal impaction    Frontal view of the abdomen shows a normal gas pattern with fecal residue projected in the rectum and distal sigmoid colon. No definite free air is identified. Right upper quadrant calcifications may be in the gallbladder. There is gaseous distention of the stomach.    IMPRESSION:  Distal fecal residue as noted.    Thank you for this referral.

## 2020-08-25 NOTE — PROGRESS NOTE ADULT - PROBLEM SELECTOR PROBLEM 1
Proctitis
Colitis
Diarrhea, unspecified type
Fecal impaction in rectum
Proctitis
Constipation, unspecified constipation type

## 2020-08-25 NOTE — PROGRESS NOTE ADULT - PROBLEM SELECTOR PLAN 1
Stercoral in etiology. No response with Lactulose 20 grams PO BID. Lactulose to be increased to 30 grams PO TID and Miralax 17 grams PO BID ordered. Diet as tolerated.
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 NO impaction on rectal exam this am
CT of abdomen revealed Stercoral Colitis  Continue Lactulose  30 grams PO TID and Miralax 17 grams PO BID.  Diet as tolerated.
continue laxitives and mineral oil enema
due to soft stool mid rectal soft stool impaction and therefore just overflow diarrhea and not an actual diarrheal illness. Needs more aggressive bowel regimen that that given at nursing home to prevent impactions. Will restart senna 2 daily and restart lactulose but at a higher dose than his usual at 20mg twice daily. OK to advance diet to his usual diet.
Now resolved, as per nurse patient had 3 soft BM's yesterday  KUB revealed distal fecal residue in the rectum and sigmoid colon  Please continue same bowel regimen at this time.

## 2020-08-25 NOTE — PROGRESS NOTE ADULT - SUBJECTIVE AND OBJECTIVE BOX
Patient is a 64y old  Male who presents with a chief complaint of Colitis (25 Aug 2020 09:58)      HPI:  65 yo male hx of dvt on xarelto, anemia schizoaffective disorder, constipation with fecal impaction, MR/CP, nonverbal. BIBA with home aid with 1 evening of multiple episodes of diarrhea for the past 1 day progressively getting worst. ually happens when he is impacted. last impaction January of this past year when found to have DVT. As per aid acting normal no distress. no recent fevers chills eating and drinking well at home. no blood in the stool. Usually has bowel movements once every other day and on senna lactulose. In ER CT scan shows Stercoral colitis. Suspicion for decreased enhancement in the posterior wall of the rectum as above which raises concern for ischemia. GI consulted by ED and admitted to medical service. (18 Aug 2020 10:53)    FAMILY HISTORY:  No pertinent family history in first degree relatives: family medical hx is unknown  No pertinent family history in first degree relatives      INTERVAL HPI/OVERNIGHT EVENTS:  Pt. is being DCed today home  In bed, in NAD, total care  completely dependent  Pt. will go on 3 ds of PO Cipro & florastor.  The patient is in no acute distress.  unable to obtain ROS nonverbal baseline   The patient is in no acute distress.  unable to obtain ROS nonverbal baseline   had many BMs      PAST MEDICAL & SURGICAL HISTORY:  DVT, lower extremity  Cerebral palsy  Schizoaffective disorder  Mental retardation  Schizoaffective disorder, bipolar type  Anemia, unspecified type  Spastic quadriplegic cerebral palsy  No significant past surgical history: no known past surgeires per care giver  No significant past surgical history    Allergies    No Known Allergies    Intolerances    Vital Signs Last 24 Hrs  T(C): 36.3 (25 Aug 2020 13:07), Max: 37.1 (25 Aug 2020 00:03)  T(F): 97.3 (25 Aug 2020 13:07), Max: 98.7 (25 Aug 2020 00:03)  HR: 73 (25 Aug 2020 13:07) (51 - 73)  BP: 166/83 (25 Aug 2020 13:07) (100/62 - 166/83)  BP(mean): --  RR: 18 (25 Aug 2020 13:07) (18 - 19)  SpO2: 95% (25 Aug 2020 13:07) (95% - 97%)    RADIOLOGY & ADDITIONAL STUDIES:    MEDICATIONS:  ciprofloxacin   IVPB 400 milliGRAM(s) IV Intermittent every 12 hours  lactulose Syrup 45 Gram(s) Oral three times a day  melatonin 3 milliGRAM(s) Oral at bedtime  metroNIDAZOLE  IVPB 500 milliGRAM(s) IV Intermittent every 8 hours  OLANZapine 2.5 milliGRAM(s) Oral daily  OLANZapine 10 milliGRAM(s) Oral at bedtime  pantoprazole    Tablet 40 milliGRAM(s) Oral before breakfast  polyethylene glycol 3350 17 Gram(s) Oral two times a day  rivaroxaban 20 milliGRAM(s) Oral with dinner  saccharomyces boulardii 250 milliGRAM(s) Oral two times a day  senna 2 Tablet(s) Oral at bedtime  temazepam 15 milliGRAM(s) Oral at bedtime PRN

## 2020-08-25 NOTE — DISCHARGE NOTE NURSING/CASE MANAGEMENT/SOCIAL WORK - PATIENT PORTAL LINK FT
You can access the FollowMyHealth Patient Portal offered by [facility] by registering at the following website: [email or web address]. By joining [facility]’s FollowMyHealth portal, you will also be able to view your health information using other applications (apps) compatible with our system.

## 2020-08-25 NOTE — PROGRESS NOTE ADULT - ASSESSMENT
64M with cerebral palsy, schizoaffective disorder, anemia, DVT, and fecal impaction who was referred to the hospital for multiple episodes of diarrhea with CT of the abdomen noting a large amount of stool in the rectum with distention and wall thickening concerning for stercoral colitis. pt has had several days of constipation. GI consulted and following. Last BM was yesterday (8/23) night. abdominal XR ordered this AM, results pending.      Constipation  slow transit with stercoral colitis . Cipro iv 400mg bid changed to PO x3ds  - improving, BM last night   - Per GI, continue current bowel regimen, repeat KUB in AM  -c/w Lactulose 45g po tid   - c/w Miralax 17g bid and senna 2 tablets at bedtime     Polycythemia - Blood count improved.    DVT - On rivaroxaban for anticoagulation.    Schizoaffective disorder - On olanzapine and temazepam.    GERD - On pantoprazole.

## 2020-08-25 NOTE — PROGRESS NOTE ADULT - REASON FOR ADMISSION
Colitis

## 2020-08-25 NOTE — PROGRESS NOTE ADULT - ATTENDING COMMENTS
Patient awake and altert. ot oriented.   + stercoral colitis  ++ constipation. NO BM with Mineral oil enema. Lactulose tid and miralax bid    A/P  contine lactulose tid, miralax bid  mineral oil enema bid
GI will sign off.  Please reconsult as needed and call with any questions or concerns.

## 2020-08-25 NOTE — PROGRESS NOTE ADULT - ASSESSMENT
Patient with constipation, stercoral colitis. Doing better, toleration oral intake, as per nurse had 3 soft BM's overnight.

## 2020-08-25 NOTE — PROGRESS NOTE ADULT - PROVIDER SPECIALTY LIST ADULT
Gastroenterology
Hospitalist
Gastroenterology

## 2020-09-27 NOTE — ED ADULT TRIAGE NOTE - RESPIRATORY RATE (BREATHS/MIN)
Patient transferred to T, to room [id number] via wheelchair by transport services. Report given to YUE Mendes, all questions answered. Patient transferred with all personal belongings and medications.   20

## 2023-01-10 PROBLEM — I82.409 ACUTE EMBOLISM AND THROMBOSIS OF UNSPECIFIED DEEP VEINS OF UNSPECIFIED LOWER EXTREMITY: Chronic | Status: ACTIVE | Noted: 2020-08-18

## 2023-01-10 PROBLEM — F25.9 SCHIZOAFFECTIVE DISORDER, UNSPECIFIED: Chronic | Status: ACTIVE | Noted: 2020-08-18

## 2023-01-10 PROBLEM — G80.9 CEREBRAL PALSY, UNSPECIFIED: Chronic | Status: ACTIVE | Noted: 2020-08-18

## 2023-02-01 ENCOUNTER — APPOINTMENT (OUTPATIENT)
Dept: RADIOLOGY | Facility: CLINIC | Age: 68
End: 2023-02-01

## 2023-02-16 ENCOUNTER — APPOINTMENT (OUTPATIENT)
Dept: RADIOLOGY | Facility: CLINIC | Age: 68
End: 2023-02-16
Payer: MEDICARE

## 2023-02-16 ENCOUNTER — OUTPATIENT (OUTPATIENT)
Dept: OUTPATIENT SERVICES | Facility: HOSPITAL | Age: 68
LOS: 1 days | End: 2023-02-16
Payer: MEDICARE

## 2023-02-16 DIAGNOSIS — M40.204 UNSPECIFIED KYPHOSIS, THORACIC REGION: ICD-10-CM

## 2023-02-16 PROCEDURE — 77080 DXA BONE DENSITY AXIAL: CPT

## 2023-02-16 PROCEDURE — 77080 DXA BONE DENSITY AXIAL: CPT | Mod: 26

## 2023-12-21 NOTE — DISCHARGE NOTE PROVIDER - NSDCMRMEDTOKEN_GEN_ALL_CORE_FT
Bed/Stretcher in lowest position, wheels locked, appropriate side rails in place/Call bell, personal items and telephone in reach/Instruct patient to call for assistance before getting out of bed/chair/stretcher/Non-slip footwear applied when patient is off stretcher/[place] to call system/Physically safe environment - no spills, clutter or unnecessary equipment/Purposeful proactive rounding/Room/bathroom lighting operational, light cord in reach Bed/Stretcher in lowest position, wheels locked, appropriate side rails in place/Call bell, personal items and telephone in reach/Instruct patient to call for assistance before getting out of bed/chair/stretcher/Non-slip footwear applied when patient is off stretcher/[place] to call system/Physically safe environment - no spills, clutter or unnecessary equipment/Purposeful proactive rounding/Room/bathroom lighting operational, light cord in reach Colace 100 mg oral capsule: orally once a day  diphenhydrAMINE 25 mg oral tablet: orally once a day (at bedtime)  enoxaparin 60 mg/0.6 mL injectable solution: 60 milligram(s) subcutaneously every 12 hours   lactulose 10 g/15 mL oral syrup: 30 milliliter(s) orally 3 times a day  Melatonin 3 mg oral tablet: 1 tab(s) orally once a day (at bedtime)  multivitamin: orally once a day  OLANZapine 10 mg oral tablet: 1 tab(s) orally once a day (at bedtime)  OLANZapine 2.5 mg oral tablet: 1 tab(s) orally once a day  omeprazole 20 mg oral delayed release capsule: 1 cap(s) orally once a day  polyethylene glycol 3350 oral powder for reconstitution: 17 gram(s) orally 2 times a day  Senna 8.6 mg oral tablet: 2 tab(s) orally once a day  temazepam 15 mg oral capsule: 1 cap(s) orally once a day (at bedtime)  Vitamin C 500 mg oral capsule: 1 cap(s) orally once a day  Vitamin D3 1000 intl units oral capsule: 1 cap(s) orally once a day Colace 100 mg oral capsule: orally once a day  diphenhydrAMINE 25 mg oral tablet: orally once a day (at bedtime)  enoxaparin 60 mg/0.6 mL injectable solution: 60 milligram(s) subcutaneously every 12 hours   lactulose 10 g/15 mL oral syrup: 30 milliliter(s) orally 3 times a day  Melatonin 3 mg oral tablet: 1 tab(s) orally once a day (at bedtime)  multivitamin: orally once a day  OLANZapine 10 mg oral tablet: 1 tab(s) orally once a day (at bedtime)  OLANZapine 2.5 mg oral tablet: 1 tab(s) orally once a day  omeprazole 20 mg oral delayed release capsule: 1 cap(s) orally once a day  polyethylene glycol 3350 oral powder for reconstitution: 17 gram(s) orally 2 times a day  Protonix 20 mg oral delayed release tablet: 1 tab(s) orally once a day   Senna 8.6 mg oral tablet: 2 tab(s) orally once a day  temazepam 15 mg oral capsule: 1 cap(s) orally once a day (at bedtime)  Vitamin C 500 mg oral capsule: 1 cap(s) orally once a day  Vitamin D3 1000 intl units oral capsule: 1 cap(s) orally once a day Colace 100 mg oral capsule: orally once a day  diphenhydrAMINE 25 mg oral tablet: orally once a day (at bedtime)  enoxaparin 60 mg/0.6 mL injectable solution: 60 milligram(s) subcutaneously every 12 hours   lactulose 10 g/15 mL oral syrup: 30 milliliter(s) orally 3 times a day  Melatonin 3 mg oral tablet: 1 tab(s) orally once a day (at bedtime)  multivitamin: orally once a day  nystatin 100,000 units/g topical powder: 1 application topically 2 times a day  OLANZapine 10 mg oral tablet: 1 tab(s) orally once a day (at bedtime)  OLANZapine 2.5 mg oral tablet: 1 tab(s) orally once a day  omeprazole 20 mg oral delayed release capsule: 1 cap(s) orally once a day  polyethylene glycol 3350 oral powder for reconstitution: 17 gram(s) orally 2 times a day  Senna 8.6 mg oral tablet: 2 tab(s) orally once a day  temazepam 15 mg oral capsule: 1 cap(s) orally once a day (at bedtime)  Vitamin C 500 mg oral capsule: 1 cap(s) orally once a day  Vitamin D3 1000 intl units oral capsule: 1 cap(s) orally once a day

## 2024-01-31 ENCOUNTER — APPOINTMENT (OUTPATIENT)
Dept: CT IMAGING | Facility: CLINIC | Age: 69
End: 2024-01-31

## 2024-02-02 ENCOUNTER — APPOINTMENT (OUTPATIENT)
Dept: CT IMAGING | Facility: CLINIC | Age: 69
End: 2024-02-02
Payer: MEDICARE

## 2024-02-02 ENCOUNTER — OUTPATIENT (OUTPATIENT)
Dept: OUTPATIENT SERVICES | Facility: HOSPITAL | Age: 69
LOS: 1 days | End: 2024-02-02
Payer: MEDICARE

## 2024-02-02 DIAGNOSIS — N28.1 CYST OF KIDNEY, ACQUIRED: ICD-10-CM

## 2024-02-02 DIAGNOSIS — N20.0 CALCULUS OF KIDNEY: ICD-10-CM

## 2024-02-02 PROCEDURE — 74176 CT ABD & PELVIS W/O CONTRAST: CPT | Mod: MH

## 2024-02-02 PROCEDURE — 74176 CT ABD & PELVIS W/O CONTRAST: CPT | Mod: 26,MH

## 2024-03-20 ENCOUNTER — OUTPATIENT (OUTPATIENT)
Dept: OUTPATIENT SERVICES | Facility: HOSPITAL | Age: 69
LOS: 1 days | End: 2024-03-20
Payer: MEDICARE

## 2024-03-20 ENCOUNTER — APPOINTMENT (OUTPATIENT)
Dept: CT IMAGING | Facility: CLINIC | Age: 69
End: 2024-03-20
Payer: MEDICARE

## 2024-03-20 DIAGNOSIS — J18.9 PNEUMONIA, UNSPECIFIED ORGANISM: ICD-10-CM

## 2024-03-20 PROCEDURE — 71250 CT THORAX DX C-: CPT | Mod: 26,MH

## 2024-03-20 PROCEDURE — 71250 CT THORAX DX C-: CPT | Mod: MH

## 2024-05-24 ENCOUNTER — APPOINTMENT (OUTPATIENT)
Dept: CT IMAGING | Facility: CLINIC | Age: 69
End: 2024-05-24

## 2024-06-05 ENCOUNTER — APPOINTMENT (OUTPATIENT)
Dept: CT IMAGING | Facility: CLINIC | Age: 69
End: 2024-06-05
Payer: MEDICARE

## 2024-06-05 ENCOUNTER — OUTPATIENT (OUTPATIENT)
Dept: OUTPATIENT SERVICES | Facility: HOSPITAL | Age: 69
LOS: 1 days | End: 2024-06-05
Payer: MEDICARE

## 2024-06-05 DIAGNOSIS — J18.9 PNEUMONIA, UNSPECIFIED ORGANISM: ICD-10-CM

## 2024-06-05 PROCEDURE — 71250 CT THORAX DX C-: CPT | Mod: 26,MA

## 2024-06-05 PROCEDURE — 71250 CT THORAX DX C-: CPT | Mod: MA

## 2024-08-07 NOTE — ED ADULT NURSE NOTE - IN THE PAST 12 MONTHS HAVE YOU USED DRUGS OTHER THAN THOSE REQUIRED FOR MEDICAL REASON?
Requested.     ----- Message from Linnea Orr sent at 8/6/2024  2:47 PM EDT -----  Please request labs from PCP   No

## 2024-08-19 NOTE — PROGRESS NOTE ADULT - ASSESSMENT
64M with cerebral palsy, schizoaffective disorder, anemia, DVT, and fecal impaction who was referred to the hospital for multiple episodes of diarrhea with CT of the abdomen noting a large amount of stool in the rectum with distention and wall thickening concerning for stercoral colitis. pt has had several days of constipation. GI consulted and following. Last BM was yesterday (8/23) night. abdominal XR ordered this AM, results pending.      Constipation  slow transit with stercoral colitis . Cipro iv 400mg bid   - improving, BM last night   - Per GI, continue current bowel regimen, repeat KUB in AM  -c/w Lactulose 45g po tid   - c/w Miralax 17g bid and senna 2 tablets at bedtime     Polycythemia - Blood count improved.    DVT - On rivaroxaban for anticoagulation.    Schizoaffective disorder - On olanzapine and temazepam.    GERD - On pantoprazole.    Dispo: further improvement of constipation. will likely need to be on PO abx if discharged. SW and CM aware. negative

## 2025-01-17 NOTE — ED ADULT TRIAGE NOTE - DIRECT TO ROOM CARE INITIATED:
Return to the ER for any fevers over 100.4, headache, worsening neck pain or stiff neck, inability to move neck freely, increased size of the lymph nodes in the neck, sore throat, difficulty swallowing fluids or saliva, muffling of the voice, decreased food or fluid intake, vomiting, rash, cough, lethargy, behavioral changes, or for any concerns/worsening.    Follow-up with your pediatrician on Monday.  Please call today to schedule an appointment.  
18-Aug-2020 05:58

## 2025-06-19 ENCOUNTER — APPOINTMENT (OUTPATIENT)
Dept: CT IMAGING | Facility: CLINIC | Age: 70
End: 2025-06-19
Payer: MEDICARE

## 2025-06-19 ENCOUNTER — OUTPATIENT (OUTPATIENT)
Dept: OUTPATIENT SERVICES | Facility: HOSPITAL | Age: 70
LOS: 1 days | End: 2025-06-19
Payer: MEDICARE

## 2025-06-19 DIAGNOSIS — N20.0 CALCULUS OF KIDNEY: ICD-10-CM

## 2025-06-19 PROCEDURE — 74176 CT ABD & PELVIS W/O CONTRAST: CPT | Mod: 26

## 2025-06-19 PROCEDURE — 74176 CT ABD & PELVIS W/O CONTRAST: CPT

## 2025-06-26 ENCOUNTER — APPOINTMENT (OUTPATIENT)
Dept: RADIOLOGY | Facility: CLINIC | Age: 70
End: 2025-06-26

## 2025-06-26 ENCOUNTER — OUTPATIENT (OUTPATIENT)
Dept: OUTPATIENT SERVICES | Facility: HOSPITAL | Age: 70
LOS: 1 days | End: 2025-06-26
Payer: MEDICARE

## 2025-06-26 DIAGNOSIS — Z00.8 ENCOUNTER FOR OTHER GENERAL EXAMINATION: ICD-10-CM

## 2025-06-26 PROCEDURE — 77080 DXA BONE DENSITY AXIAL: CPT | Mod: 26

## 2025-06-26 PROCEDURE — 77080 DXA BONE DENSITY AXIAL: CPT
